# Patient Record
Sex: MALE | Race: WHITE | Employment: FULL TIME | URBAN - METROPOLITAN AREA
[De-identification: names, ages, dates, MRNs, and addresses within clinical notes are randomized per-mention and may not be internally consistent; named-entity substitution may affect disease eponyms.]

---

## 2017-02-20 ENCOUNTER — LAB ONLY (OUTPATIENT)
Dept: INTERNAL MEDICINE CLINIC | Age: 32
End: 2017-02-20

## 2017-02-20 DIAGNOSIS — G43.909 MIGRAINE WITHOUT STATUS MIGRAINOSUS, NOT INTRACTABLE, UNSPECIFIED MIGRAINE TYPE: ICD-10-CM

## 2017-02-20 DIAGNOSIS — Z13.1 DIABETES MELLITUS SCREENING: ICD-10-CM

## 2017-02-20 DIAGNOSIS — Z13.220 LIPID SCREENING: ICD-10-CM

## 2017-02-21 LAB
ALBUMIN SERPL-MCNC: 4.2 G/DL (ref 3.5–5.5)
ALBUMIN/GLOB SERPL: 1.7 {RATIO} (ref 1.1–2.5)
ALP SERPL-CCNC: 54 IU/L (ref 39–117)
ALT SERPL-CCNC: 11 IU/L (ref 0–44)
AST SERPL-CCNC: 15 IU/L (ref 0–40)
BASOPHILS # BLD AUTO: 0 X10E3/UL (ref 0–0.2)
BASOPHILS NFR BLD AUTO: 1 %
BILIRUB SERPL-MCNC: 0.5 MG/DL (ref 0–1.2)
BUN SERPL-MCNC: 8 MG/DL (ref 6–20)
BUN/CREAT SERPL: 10 (ref 8–19)
CALCIUM SERPL-MCNC: 9.6 MG/DL (ref 8.7–10.2)
CHLORIDE SERPL-SCNC: 101 MMOL/L (ref 96–106)
CHOLEST SERPL-MCNC: 192 MG/DL (ref 100–199)
CO2 SERPL-SCNC: 29 MMOL/L (ref 18–29)
CREAT SERPL-MCNC: 0.84 MG/DL (ref 0.76–1.27)
EOSINOPHIL # BLD AUTO: 0.2 X10E3/UL (ref 0–0.4)
EOSINOPHIL NFR BLD AUTO: 4 %
ERYTHROCYTE [DISTWIDTH] IN BLOOD BY AUTOMATED COUNT: 13.1 % (ref 12.3–15.4)
EST. AVERAGE GLUCOSE BLD GHB EST-MCNC: 111 MG/DL
GLOBULIN SER CALC-MCNC: 2.5 G/DL (ref 1.5–4.5)
GLUCOSE SERPL-MCNC: 98 MG/DL (ref 65–99)
HBA1C MFR BLD: 5.5 % (ref 4.8–5.6)
HCT VFR BLD AUTO: 44.3 % (ref 37.5–51)
HDLC SERPL-MCNC: 30 MG/DL
HGB BLD-MCNC: 15.3 G/DL (ref 12.6–17.7)
IMM GRANULOCYTES # BLD: 0 X10E3/UL (ref 0–0.1)
IMM GRANULOCYTES NFR BLD: 0 %
INTERPRETATION, 910389: NORMAL
LDLC SERPL CALC-MCNC: 121 MG/DL (ref 0–99)
LYMPHOCYTES # BLD AUTO: 1.3 X10E3/UL (ref 0.7–3.1)
LYMPHOCYTES NFR BLD AUTO: 31 %
MCH RBC QN AUTO: 31.7 PG (ref 26.6–33)
MCHC RBC AUTO-ENTMCNC: 34.5 G/DL (ref 31.5–35.7)
MCV RBC AUTO: 92 FL (ref 79–97)
MONOCYTES # BLD AUTO: 0.5 X10E3/UL (ref 0.1–0.9)
MONOCYTES NFR BLD AUTO: 11 %
NEUTROPHILS # BLD AUTO: 2.3 X10E3/UL (ref 1.4–7)
NEUTROPHILS NFR BLD AUTO: 53 %
PLATELET # BLD AUTO: 138 X10E3/UL (ref 150–379)
POTASSIUM SERPL-SCNC: 4.9 MMOL/L (ref 3.5–5.2)
PROT SERPL-MCNC: 6.7 G/DL (ref 6–8.5)
RBC # BLD AUTO: 4.82 X10E6/UL (ref 4.14–5.8)
SODIUM SERPL-SCNC: 142 MMOL/L (ref 134–144)
TRIGL SERPL-MCNC: 207 MG/DL (ref 0–149)
TSH SERPL DL<=0.005 MIU/L-ACNC: 1.26 UIU/ML (ref 0.45–4.5)
VLDLC SERPL CALC-MCNC: 41 MG/DL (ref 5–40)
WBC # BLD AUTO: 4.3 X10E3/UL (ref 3.4–10.8)

## 2017-02-24 ENCOUNTER — OFFICE VISIT (OUTPATIENT)
Dept: INTERNAL MEDICINE CLINIC | Age: 32
End: 2017-02-24

## 2017-02-24 VITALS
WEIGHT: 140 LBS | DIASTOLIC BLOOD PRESSURE: 61 MMHG | TEMPERATURE: 98.1 F | HEART RATE: 62 BPM | HEIGHT: 71 IN | RESPIRATION RATE: 16 BRPM | BODY MASS INDEX: 19.6 KG/M2 | SYSTOLIC BLOOD PRESSURE: 109 MMHG

## 2017-02-24 DIAGNOSIS — M79.642 BILATERAL HAND PAIN: Primary | ICD-10-CM

## 2017-02-24 DIAGNOSIS — S99.921A INJURY OF TOENAIL OF RIGHT FOOT, INITIAL ENCOUNTER: ICD-10-CM

## 2017-02-24 DIAGNOSIS — M79.641 BILATERAL HAND PAIN: Primary | ICD-10-CM

## 2017-02-24 DIAGNOSIS — G43.909 MIGRAINE WITHOUT STATUS MIGRAINOSUS, NOT INTRACTABLE, UNSPECIFIED MIGRAINE TYPE: ICD-10-CM

## 2017-02-24 NOTE — PATIENT INSTRUCTIONS
Learning About High Cholesterol  What is high cholesterol? Cholesterol is a type of fat in your blood. It is needed for many body functions, such as making new cells. Cholesterol is made by your body. It also comes from food you eat. If you have too much cholesterol, it starts to build up in your arteries. This is called hardening of the arteries, or atherosclerosis. High cholesterol raises your risk of a heart attack and stroke. There are different types of cholesterol. LDL is the \"bad\" cholesterol. High LDL can raise your risk for heart disease, heart attack, and stroke. HDL is the \"good\" cholesterol. High HDL is linked with a lower risk for heart disease, heart attack, and stroke. Your cholesterol levels help your doctor find out your risk for having a heart attack or stroke. How can you prevent high cholesterol? A heart-healthy lifestyle can help you prevent high cholesterol. This lifestyle helps lower your risk for a heart attack and stroke. · Eat heart-healthy foods. ¨ Eat fruits, vegetables, whole grains (like oatmeal), dried beans and peas, nuts and seeds, soy products (like tofu), and fat-free or low-fat dairy products. ¨ Replace butter, margarine, and hydrogenated or partially hydrogenated oils with olive and canola oils. (Canola oil margarine without trans fat is fine.)  ¨ Replace red meat with fish, poultry, and soy protein (like tofu). ¨ Limit processed and packaged foods like chips, crackers, and cookies. · Be active. Exercise can improve your cholesterol level. Get at least 30 minutes of exercise on most days of the week. Walking is a good choice. You also may want to do other activities, such as running, swimming, cycling, or playing tennis or team sports. · Stay at a healthy weight. Lose weight if you need to. · Don't smoke. If you need help quitting, talk to your doctor about stop-smoking programs and medicines. These can increase your chances of quitting for good.   How is high cholesterol treated? The goal of treatment is to reduce your chances of having a heart attack or stroke. The goal is not to lower your cholesterol numbers only. · You may make lifestyle changes, such as eating healthy foods, not smoking, losing weight, and being more active. · You may have to take medicine. Follow-up care is a key part of your treatment and safety. Be sure to make and go to all appointments, and call your doctor if you are having problems. It's also a good idea to know your test results and keep a list of the medicines you take. Where can you learn more? Go to http://vanessa-jaylen.info/. Enter L175 in the search box to learn more about \"Learning About High Cholesterol. \"  Current as of: January 27, 2016  Content Version: 11.1  © 0206-1491 [employer]. Care instructions adapted under license by [employer] (which disclaims liability or warranty for this information). If you have questions about a medical condition or this instruction, always ask your healthcare professional. [address] any warranty or liability for your use of this information.

## 2017-02-24 NOTE — PROGRESS NOTES
CC:  Chief Complaint   Patient presents with    Headache    Hand Pain     bilateral     HISTORY OF PRESENT ILLNESS  Jenny Alvarado is a 32 y.o. male    Presents for 4 month follow up evaluation. He has migraine headaches and tinea versicolor. Today he complains of bilateral hand pain after driving 16 hours to and from [place] last weekend. Also complains of thickened toenail at right foot does not grow and has started to hurt. Last weekend drove to and form [place] 16 hours each way. Saw Dr. Gregory Givens in 2/57 for complicated migraines. No changes made.     Soc Hx  Single. Lives alone. Engaged. No children. 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 Never smoker. Drinks 2-3 beers per week. Denies recreational drug use. Joined gym; goes every other day; doing yoga and running.  Drinks Coke 3 times a week.      ROS  Constitutional: negative for fevers, chills, night sweats  ENT:   negative for sore throat, nasal congestion; positive for mild allergy symptoms  Respiratory:  negative for cough, dyspnea, wheezing  CV:   negative for chest pain, palpitations, lower extremity edema  GI:   negative for heartburn, abd pain, nausea, vomiting, diarrhea, constipation  Genitourinary: negative for frequency, dysuria and hematuria  Integument:  negative for rash and pruritus  Musculoskel: negative for myalgias, arthralgias, back pain, muscle weakness, joint pain  Neurological:  negative for headaches, dizziness, vertigo, gait problems  Behavl/Psych: negative for feelings of anxiety, depression, mood change     Patient Active Problem List   Diagnosis Code    Migraine G43.909     Past Medical History:   Diagnosis Date    Headache     Headache(784.0)     Stroke ([facility] [address]      Allergies   Allergen Reactions    Cucumber Fruit Extract Anaphylaxis    Fioricet [Butalbital-Acetaminophen-Caff] Other (comments)     Current Outpatient Prescriptions   Medication Sig Dispense Refill    multivitamin (ONE A DAY) tablet Take 1 Tab by mouth daily. Gummies           PHYSICAL EXAM  Visit Vitals    /61 (BP 1 Location: Left arm, BP Patient Position: Sitting)    Pulse 62    Temp 98.1 °F (36.7 °C) (Oral)    Resp 16    Ht 5' 11\" (1.803 m)    Wt 140 lb (63.5 kg)    BMI 19.53 kg/m2       General: Well-developed and well-nourished, no distress. HEENT:  Head normocephalic/atraumatic, no scleral icterus  Lungs:  Clear to ausculation bilaterally. Good air movement. Heart:  Regular rate and rhythm, normal S1 and S2, no murmur, gallop, or rub  Extremities: No clubbing, cyanosis, or edema. Right 2nd toe with marked thickening and mild yellowish discoloration. No synovitis or swelling at hand joints. Normal  at hands. Neurological: Alert and oriented. Psychiatric: Normal mood and affect. Behavior is normal.     Results for orders placed or performed in visit on 54/44/88   METABOLIC PANEL, COMPREHENSIVE   Result Value Ref Range    Glucose 98 65 - 99 mg/dL    BUN 8 6 - 20 mg/dL    Creatinine 0.84 0.76 - 1.27 mg/dL    GFR est non- >59 mL/min/1.73    GFR est  >59 mL/min/1.73    BUN/Creatinine ratio 10 8 - 19    Sodium 142 134 - 144 mmol/L    Potassium 4.9 3.5 - 5.2 mmol/L    Chloride 101 96 - 106 mmol/L    CO2 29 18 - 29 mmol/L    Calcium 9.6 8.7 - 10.2 mg/dL    Protein, total 6.7 6.0 - 8.5 g/dL    Albumin 4.2 3.5 - 5.5 g/dL    GLOBULIN, TOTAL 2.5 1.5 - 4.5 g/dL    A-G Ratio 1.7 1.1 - 2.5    Bilirubin, total 0.5 0.0 - 1.2 mg/dL    Alk.  phosphatase 54 39 - 117 IU/L    AST (SGOT) 15 0 - 40 IU/L    ALT (SGPT) 11 0 - 44 IU/L   CBC WITH AUTOMATED DIFF   Result Value Ref Range    WBC 4.3 3.4 - 10.8 x10E3/uL    RBC 4.82 4.14 - 5.80 x10E6/uL    HGB 15.3 12.6 - 17.7 g/dL    HCT 44.3 37.5 - 51.0 %    MCV 92 79 - 97 fL    MCH 31.7 26.6 - 33.0 pg    MCHC 34.5 31.5 - 35.7 g/dL    RDW 13.1 12.3 - 15.4 %    PLATELET 146 (L) 562 - 379 x10E3/uL    NEUTROPHILS 53 %    Lymphocytes 31 %    MONOCYTES 11 %    EOSINOPHILS 4 %    BASOPHILS 1 % ABS. NEUTROPHILS 2.3 1.4 - 7.0 x10E3/uL    Abs Lymphocytes 1.3 0.7 - 3.1 x10E3/uL    ABS. MONOCYTES 0.5 0.1 - 0.9 x10E3/uL    ABS. EOSINOPHILS 0.2 0.0 - 0.4 x10E3/uL    ABS. BASOPHILS 0.0 0.0 - 0.2 x10E3/uL    IMMATURE GRANULOCYTES 0 %    ABS. IMM. GRANS. 0.0 0.0 - 0.1 x10E3/uL   HEMOGLOBIN A1C WITH EAG   Result Value Ref Range    Hemoglobin A1c 5.5 4.8 - 5.6 %    Estimated average glucose 111 mg/dL   TSH 3RD GENERATION   Result Value Ref Range    TSH 1.260 0.450 - 4.500 uIU/mL   LIPID PANEL   Result Value Ref Range    Cholesterol, total 192 100 - 199 mg/dL    Triglyceride 207 (H) 0 - 149 mg/dL    HDL Cholesterol 30 (L) >39 mg/dL    VLDL, calculated 41 (H) 5 - 40 mg/dL    LDL, calculated 121 (H) 0 - 99 mg/dL   CVD REPORT   Result Value Ref Range    INTERPRETATION Note          ASSESSMENT AND PLAN    ICD-10-CM ICD-9-CM    1. Bilateral hand pain M79.641 729.5     M79.642     2. Migraine without status migrainosus, not intractable, unspecified migraine type G43.909 346.90    3. Injury of toenail of right foot, initial encounter S99.921A 959.7 REFERRAL TO PODIATRY     Bilateral hand pain due to strain from prolonged driving. Referred to Podiatry. Current treatment plan is effective. No change in therapy. Lab results reviewed with patient    Follow-up Disposition:  Return in about 1 year (around 2/24/2018), or if symptoms worsen or fail to improve, for Annual physical examination. Provided patient and/or family with advanced directive information and answered pertinent questions. Encouraged patient to provide a copy of advanced directive to the office when available. I have discussed the diagnosis with the patient and the intended plan as seen in the above orders. Patient is in agreement. The patient has received an after-visit summary and questions were answered concerning future plans. I have discussed medication side effects and warnings with the patient as well.

## 2017-02-24 NOTE — MR AVS SNAPSHOT
Visit Information Date & Time Provider Department Dept. Phone Encounter #  
 2/24/2017  3:00 PM Sanford Cohen, [address] [facility] [phone] [id number] Follow-up Instructions Return in about 1 year (around 2/24/2018), or if symptoms worsen or fail to improve, for Annual physical examination. Upcoming Health Maintenance Date Due DTaP/Tdap/Td series (2 - Td) 10/19/2026 Allergies as of 2/24/2017  Review Complete On: 2/24/2017 By: Charlette Maloney LPN Severity Noted Reaction Type Reactions Cucumber Fruit Extract High 05/15/2014    Anaphylaxis Fioricet [Butalbital-acetaminophen-caff]  04/22/2014    Other (comments) Current Immunizations  Never Reviewed Name Date Influenza Vaccine 8/1/2016 Tdap 10/19/2016 Not reviewed this visit You Were Diagnosed With   
  
 Codes Comments Bilateral hand pain    -  Primary ICD-10-CM: M79.641, [id number] ICD-9-CM: 729.5 Migraine without status migrainosus, not intractable, unspecified migraine type     ICD-10-CM: G43.909 ICD-9-CM: 346.90 Injury of toenail of right foot, initial encounter     ICD-10-CM: P76.656X ICD-9-CM: 332. 7 Vitals BP  
  
  
  
  
  
 109/61 (BP 1 Location: Left arm, BP Patient Position: Sitting) BMI and BSA Data Body Mass Index Body Surface Area  
 19.53 kg/m 2 1.78 m 2 Preferred Pharmacy Pharmacy Name Phone [facility] [phone] Your Updated Medication List  
  
   
This list is accurate as of: 2/24/17  3:18 PM.  Always use your most recent med list.  
  
  
  
  
 multivitamin tablet Commonly known as:  ONE A DAY Take 1 Tab by mouth daily. Gummies We Performed the Following REFERRAL TO PODIATRY [REF90 Custom] Comments:  
 Please evaluate and manage abnormal toenail growth after injury and possible onychomycosis at right 2nd toe. Follow-up Instructions Return in about 1 year (around 2/24/2018), or if symptoms worsen or fail to improve, for Annual physical examination. Referral Information Referral ID Referred By Referred To  
  
 [id number] [facility], [address] Gerard NICOLAS Asif, [address] Visits Status Start Date End Date 1 New Request 2/24/17 2/24/18 If your referral has a status of pending review or denied, additional information will be sent to support the outcome of this decision. Patient Instructions Learning About High Cholesterol What is high cholesterol? Cholesterol is a type of fat in your blood. It is needed for many body functions, such as making new cells. Cholesterol is made by your body. It also comes from food you eat. If you have too much cholesterol, it starts to build up in your arteries. This is called hardening of the arteries, or atherosclerosis. High cholesterol raises your risk of a heart attack and stroke. There are different types of cholesterol. LDL is the \"bad\" cholesterol. High LDL can raise your risk for heart disease, heart attack, and stroke. HDL is the \"good\" cholesterol. High HDL is linked with a lower risk for heart disease, heart attack, and stroke. Your cholesterol levels help your doctor find out your risk for having a heart attack or stroke. How can you prevent high cholesterol? A heart-healthy lifestyle can help you prevent high cholesterol. This lifestyle helps lower your risk for a heart attack and stroke. · Eat heart-healthy foods. ¨ Eat fruits, vegetables, whole grains (like oatmeal), dried beans and peas, nuts and seeds, soy products (like tofu), and fat-free or low-fat dairy products. ¨ Replace butter, margarine, and hydrogenated or partially hydrogenated oils with olive and canola oils. (Canola oil margarine without trans fat is fine.) ¨ Replace red meat with fish, poultry, and soy protein (like tofu). ¨ Limit processed and packaged foods like chips, crackers, and cookies. · Be active. Exercise can improve your cholesterol level. Get at least 30 minutes of exercise on most days of the week. Walking is a good choice. You also may want to do other activities, such as running, swimming, cycling, or playing tennis or team sports. · Stay at a healthy weight. Lose weight if you need to. · Don't smoke. If you need help quitting, talk to your doctor about stop-smoking programs and medicines. These can increase your chances of quitting for good. How is high cholesterol treated? The goal of treatment is to reduce your chances of having a heart attack or stroke. The goal is not to lower your cholesterol numbers only. · You may make lifestyle changes, such as eating healthy foods, not smoking, losing weight, and being more active. · You may have to take medicine. Follow-up care is a key part of your treatment and safety. Be sure to make and go to all appointments, and call your doctor if you are having problems. It's also a good idea to know your test results and keep a list of the medicines you take. Where can you learn more? Go to http://vanessa-jaylen.info/. Enter Y330 in the search box to learn more about \"Learning About High Cholesterol. \" Current as of: January 27, 2016 Content Version: 11.1 © 6689-4051 [employer]. Care instructions adapted under license by [employer] (which disclaims liability or warranty for this information). If you have questions about a medical condition or this instruction, always ask your healthcare professional. [address] any warranty or liability for your use of this information. Introducing [facility]! Dear Larry Fearing: Thank you for requesting a [employer] account.   Our records indicate that you already have an active ShotSpotter account. You can access your account anytime at [email or web address] Did you know that you can access your hospital and ER discharge instructions at any time in ShotSpotter? You can also review all of your test results from your hospital stay or ER visit. Additional Information If you have questions, please visit the Frequently Asked Questions section of the ShotSpotter website at [email or web address] Remember, ShotSpotter is NOT to be used for urgent needs. For medical emergencies, dial 911. Now available from your iPhone and Android! Please provide this summary of care documentation to your next provider. Your primary care clinician is listed as Migdalia Carlin. If you have any questions after today's visit, please call [phone].

## 2017-02-24 NOTE — PROGRESS NOTES
Will discuss with pt at today's appt. Normal CMP, A1c, TSH. Plt ct low at 138K, rest of CBC nl. Tot chol nl but TG high at 207 with HDL low at  30.

## 2017-02-24 NOTE — PROGRESS NOTES
Reviewed record  In preparation for visit and have obtained necessary documentation. 1. Have you been to the ER, urgent care clinic since your last visit? Hospitalized since your last visit?no  2. Have you seen or consulted any other health care providers outside of the [address] [place] [address] since your last visit? Include any pap smears or colon screening. Saw neuro Dr Josie Garcia    Patient does not currently have advance directives. Patient given information on advance directives and brochure and printed blank advance directive form made available to patient. Patient is also asked to make copy of directives available to this office for our/[facility] records once advanced directives are completed.

## 2017-09-11 ENCOUNTER — OFFICE VISIT (OUTPATIENT)
Dept: FAMILY MEDICINE CLINIC | Age: 32
End: 2017-09-11

## 2017-09-11 VITALS
RESPIRATION RATE: 16 BRPM | WEIGHT: 144.8 LBS | TEMPERATURE: 97.7 F | SYSTOLIC BLOOD PRESSURE: 121 MMHG | HEIGHT: 71 IN | HEART RATE: 75 BPM | DIASTOLIC BLOOD PRESSURE: 85 MMHG | BODY MASS INDEX: 20.27 KG/M2 | OXYGEN SATURATION: 98 %

## 2017-09-11 DIAGNOSIS — J02.9 SORE THROAT: ICD-10-CM

## 2017-09-11 DIAGNOSIS — R09.82 POST-NASAL DRIP: Primary | ICD-10-CM

## 2017-09-11 LAB
S PYO AG THROAT QL: NEGATIVE
VALID INTERNAL CONTROL?: YES

## 2017-09-11 NOTE — PROGRESS NOTES
Karlie Orellana is a 28 y.o. male   Chief Complaint   Patient presents with    Sore Throat     Started last pm     Pt states he moved a lot of moldy carpeting yesterday then noticed his throat was feeling swollen and sore along with a runny nose and a cough. Non productive cough. Took mucinex D last night without any relief. he is a 28y.o. year old male who presents for evalution. Reviewed PmHx, RxHx, FmHx, SocHx, AllgHx and updated and dated in the chart. Review of Systems - negative except as listed above in the HPI    Objective:     Vitals:    09/11/17 1231   BP: 121/85   Pulse: 75   Resp: 16   Temp: 97.7 °F (36.5 °C)   TempSrc: Oral   SpO2: 98%   Weight: 144 lb 12.8 oz (65.7 kg)   Height: 5' 11\" (1.803 m)       Current Outpatient Prescriptions   Medication Sig    multivitamin (ONE A DAY) tablet Take 1 Tab by mouth daily. Gummies     No current facility-administered medications for this visit. Physical Examination: General appearance - alert, well appearing, and in no distress  Eyes - pupils equal and reactive, extraocular eye movements intact  Ears - bilateral TM's and external ear canals normal  Mouth - cobblestoning of post pharynx  Neck - supple, no significant adenopathy  Chest - clear to auscultation, no wheezes, rales or rhonchi, symmetric air entry  Heart - normal rate, regular rhythm, normal S1, S2, no murmurs, rubs, clicks or gallops      Assessment/ Plan:   Diagnoses and all orders for this visit:    1. Post-nasal drip    2. Sore throat  -     AMB POC RAPID STREP A     no strep, zyrtec daily, benadryl qhs prn  Follow-up Disposition:  Return if symptoms worsen or fail to improve. I have discussed the diagnosis with the patient and the intended plan as seen in the above orders. The patient has received an after-visit summary and questions were answered concerning future plans. Pt conveyed understanding of plan.     Medication Side Effects and Warnings were discussed with patient      [address] [facility], DO

## 2017-09-11 NOTE — PATIENT INSTRUCTIONS
Sore Throat: Care Instructions  Your Care Instructions    Infection by bacteria or a virus causes most sore throats. Cigarette smoke, dry air, air pollution, allergies, and yelling can also cause a sore throat. Sore throats can be painful and annoying. Fortunately, most sore throats go away on their own. If you have a bacterial infection, your doctor may prescribe antibiotics. Follow-up care is a key part of your treatment and safety. Be sure to make and go to all appointments, and call your doctor if you are having problems. It's also a good idea to know your test results and keep a list of the medicines you take. How can you care for yourself at home? · If your doctor prescribed antibiotics, take them as directed. Do not stop taking them just because you feel better. You need to take the full course of antibiotics. · Gargle with warm salt water once an hour to help reduce swelling and relieve discomfort. Use 1 teaspoon of salt mixed in 1 cup of warm water. · Take an over-the-counter pain medicine, such as acetaminophen (Tylenol), ibuprofen (Advil, Motrin), or naproxen (Aleve). Read and follow all instructions on the label. · Be careful when taking over-the-counter cold or flu medicines and Tylenol at the same time. Many of these medicines have acetaminophen, which is Tylenol. Read the labels to make sure that you are not taking more than the recommended dose. Too much acetaminophen (Tylenol) can be harmful. · Drink plenty of fluids. Fluids may help soothe an irritated throat. Hot fluids, such as tea or soup, may help decrease throat pain. · Use over-the-counter throat lozenges to soothe pain. Regular cough drops or hard candy may also help. These should not be given to young children because of the risk of choking. · Do not smoke or allow others to smoke around you. If you need help quitting, talk to your doctor about stop-smoking programs and medicines.  These can increase your chances of quitting for good.  · Use a vaporizer or humidifier to add moisture to your bedroom. Follow the directions for cleaning the machine. When should you call for help? Call your doctor now or seek immediate medical care if:  · You have new or worse trouble swallowing. · Your sore throat gets much worse on one side. Watch closely for changes in your health, and be sure to contact your doctor if you do not get better as expected. Where can you learn more? Go to http://vanessa-jaylen.info/. Enter [phone] in the search box to learn more about \"Sore Throat: Care Instructions. \"  Current as of: July 29, 2016  Content Version: 11.3  © 6908-3850 [employer]. Care instructions adapted under license by [employer] [facility] (which disclaims liability or warranty for this information). If you have questions about a medical condition or this instruction, always ask your healthcare professional. [address] any warranty or liability for your use of this information.

## 2018-05-10 ENCOUNTER — OFFICE VISIT (OUTPATIENT)
Dept: INTERNAL MEDICINE CLINIC | Facility: CLINIC | Age: 33
End: 2018-05-10

## 2018-05-10 VITALS
DIASTOLIC BLOOD PRESSURE: 59 MMHG | TEMPERATURE: 97.6 F | BODY MASS INDEX: 19.32 KG/M2 | WEIGHT: 138 LBS | RESPIRATION RATE: 16 BRPM | SYSTOLIC BLOOD PRESSURE: 99 MMHG | OXYGEN SATURATION: 98 % | HEART RATE: 59 BPM | HEIGHT: 71 IN

## 2018-05-10 DIAGNOSIS — N41.0 ACUTE PROSTATITIS: ICD-10-CM

## 2018-05-10 DIAGNOSIS — R31.9 HEMATURIA, UNSPECIFIED TYPE: Primary | ICD-10-CM

## 2018-05-10 LAB
BILIRUB UR QL STRIP: NEGATIVE
GLUCOSE UR-MCNC: NEGATIVE MG/DL
KETONES P FAST UR STRIP-MCNC: NEGATIVE MG/DL
PH UR STRIP: 7 [PH] (ref 4.6–8)
PROT UR QL STRIP: NEGATIVE
SP GR UR STRIP: 1.02 (ref 1–1.03)
UA UROBILINOGEN AMB POC: NORMAL (ref 0.2–1)
URINALYSIS CLARITY POC: CLEAR
URINALYSIS COLOR POC: YELLOW
URINE BLOOD POC: NORMAL
URINE LEUKOCYTES POC: NEGATIVE
URINE NITRITES POC: NEGATIVE

## 2018-05-10 RX ORDER — CIPROFLOXACIN 500 MG/1
500 TABLET ORAL 2 TIMES DAILY
Qty: 42 TAB | Refills: 1 | Status: SHIPPED | OUTPATIENT
Start: 2018-05-10 | End: 2018-06-11 | Stop reason: ALTCHOICE

## 2018-05-10 NOTE — PATIENT INSTRUCTIONS
Prostatitis: Care Instructions  Your Care Instructions    The prostate gland is a small, walnut-shaped organ. It lies just below a man's bladder. It surrounds the urethra, the tube that carries urine through the penis and out of the body. Prostatitis is a painful condition caused by inflammation or infection of the prostate gland. Sometimes the condition is caused by bacteria, but often the cause is not known. Prostatitis caused by bacteria usually is treated with self-care and antibiotics. Follow-up care is a key part of your treatment and safety. Be sure to make and go to all appointments, and call your doctor if you are having problems. It's also a good idea to know your test results and keep a list of the medicines you take. How can you care for yourself at home? · If your doctor prescribed antibiotics, take them as directed. Do not stop taking them just because you feel better. You need to take the full course of antibiotics. · Take an over-the-counter pain medicine, such as acetaminophen (Tylenol), ibuprofen (Advil, Motrin), or naproxen (Aleve). Be safe with medicines. Read and follow all instructions on the label. · Take warm baths to help soothe pain. · Straining to pass stools can hurt when your prostate is inflamed. Avoid constipation. ¨ Include fruits, vegetables, beans, and whole grains in your diet each day. These foods are high in fiber. ¨ Drink plenty of fluids, enough so that your urine is light yellow or clear like water. If you have kidney, heart, or liver disease and have to limit fluids, talk with your doctor before you increase the amount of fluids you drink. ¨ Get some exercise every day. Build up slowly to 30 to 60 minutes a day on 5 or more days of the week. ¨ Take a fiber supplement, such as Citrucel or Metamucil, every day if needed. Read and follow all instructions on the label. ¨ Schedule time each day for a bowel movement. Having a daily routine may help.  Take your time and do not strain when having a bowel movement. · Avoid alcohol, caffeine, and spicy foods, especially if they make your symptoms worse. When should you call for help? Call your doctor now or seek immediate medical care if:  ? · You have symptoms of a urinary tract infection. These may include:  ¨ Pain or burning when you urinate. ¨ A frequent need to urinate without being able to pass much urine. ¨ Pain in the flank, which is just below the rib cage and above the waist on either side of the back. ¨ Blood in your urine. ¨ A fever. ? Watch closely for changes in your health, and be sure to contact your doctor if:  ? · You cannot empty your bladder completely. ? · You do not get better as expected. Where can you learn more? Go to http://vanessa-jaylen.info/. Enter H800 in the search box to learn more about \"Prostatitis: Care Instructions. \"  Current as of: March 14, 2017  Content Version: 11.4  © 0420-5474 AKT. Care instructions adapted under license by Sypher[employer] (which disclaims liability or warranty for this information). If you have questions about a medical condition or this instruction, always ask your healthcare professional. [place] [address] any warranty or liability for your use of this information. Blood in the Urine: Care Instructions  Your Care Instructions    Blood in the urine, or hematuria, may make the urine look red, brown, or pink. There may be blood every time you urinate or just from time to time. You cannot always see blood in the urine, but it will show up in a urine test.  Blood in the urine may be serious. It should always be checked by a doctor. Your doctor may recommend more tests, including an X-ray, a CT scan, or a cystoscopy (which lets a doctor look inside the urethra and bladder). Blood in the urine can be a sign of another problem. Common causes are bladder infections and kidney stones.  An injury to your groin or your genital area can also cause bleeding in the urinary tract. Very hard exercise-such as running a marathon-can cause blood in the urine. Blood in the urine can also be a sign of kidney disease or cancer in the bladder or kidney. Many cases of blood in the urine are caused by a harmless condition that runs in families. This is called benign familial hematuria. It does not need any treatment. Sometimes your urine may look red or brown even though it does not contain blood. For example, not getting enough fluids (dehydration), taking certain medicines, or having a liver problem can change the color of your urine. Eating foods such as beets, rhubarb, or blackberries or foods with red food coloring can make your urine look red or pink. Follow-up care is a key part of your treatment and safety. Be sure to make and go to all appointments, and call your doctor if you are having problems. It's also a good idea to know your test results and keep a list of the medicines you take. When should you call for help? Call your doctor now or seek immediate medical care if:  · You have symptoms of a urinary infection. For example:  ¨ You have pus in your urine. ¨ You have pain in your back just below your rib cage. This is called flank pain. ¨ You have a fever, chills, or body aches. ¨ It hurts to urinate. ¨ You have groin or belly pain. · You have more blood in your urine. Watch closely for changes in your health, and be sure to contact your doctor if:  · You have new urination problems. · You do not get better as expected. Where can you learn more? Go to http://vanessa-jaylen.info/. Enter D620 in the search box to learn more about \"Blood in the Urine: Care Instructions. \"  Current as of: May 12, 2017  Content Version: 11.4  © 4251-3696 [employer]. Care instructions adapted under license by [employer]otofeedback (which disclaims liability or warranty for this information). If you have questions about a medical condition or this instruction, always ask your healthcare professional. [place] [address] any warranty or liability for your use of this information.

## 2018-05-10 NOTE — MR AVS SNAPSHOT
[address] [address] [phone] Patient: Sean Chacon MRN: [id number] :1985 Visit Information Date & Time Provider Department Dept. Phone Encounter #  
 5/10/2018  9:10 AM MD [facility] [phone] [id number] Follow-up Instructions Return in about 1 month (around 6/10/2018), or if symptoms worsen or fail to improve, for Annual physical exam; fasting labs 1 week prior to appt. Upcoming Health Maintenance Date Due Influenza Age 5 to Adult 2018 DTaP/Tdap/Td series (2 - Td) 10/19/2026 Allergies as of 5/10/2018  Review Complete On: 5/10/2018 By: Germania Charles MD  
  
 Severity Noted Reaction Type Reactions Cucumber Fruit Extract High 05/15/2014    Anaphylaxis Fioricet [Butalbital-acetaminophen-caff]  2014    Other (comments) Current Immunizations  Never Reviewed Name Date Influenza Vaccine 2016 Tdap 10/19/2016 Not reviewed this visit You Were Diagnosed With   
  
 Codes Comments Hematuria, unspecified type    -  Primary ICD-10-CM: R31.9 ICD-9-CM: 599.70 Acute prostatitis     ICD-10-CM: N41.0 ICD-9-CM: 601.0 Vitals BP Pulse Temp Resp Height(growth percentile) Weight(growth percentile) 99/59 (BP 1 Location: Right arm, BP Patient Position: Sitting) (!) 59 97.6 °F (36.4 °C) (Oral) 16 5' 11\" (1.803 m) 138 lb (62.6 kg) SpO2 BMI Smoking Status 98% 19.25 kg/m2 Never Smoker BMI and BSA Data Body Mass Index Body Surface Area  
 19.25 kg/m 2 1.77 m 2 Preferred Pharmacy Pharmacy Name Phone [id number] [phone] Your Updated Medication List  
  
   
This list is accurate as of 5/10/18  9:29 AM.  Always use your most recent med list.  
  
  
  
  
 ciprofloxacin HCl 500 mg tablet Commonly known as:  CIPRO Take 1 Tab by mouth two (2) times a day. Take total of 6 weeks. Prescriptions Sent to Pharmacy Refills  
 ciprofloxacin HCl (CIPRO) 500 mg tablet 1 Sig: Take 1 Tab by mouth two (2) times a day. Take total of 6 weeks. Class: Normal  
 Pharmacy: [id number], [address] Ph #: [phone] Route: Oral  
  
We Performed the Following AMB POC URINALYSIS DIP STICK AUTO W/O MICRO [39274 CPT(R)] Follow-up Instructions Return in about 1 month (around 6/10/2018), or if symptoms worsen or fail to improve, for Annual physical exam; fasting labs 1 week prior to appt. To-Do List   
 05/10/2018 Imaging:  CT ABD PELV WO CONT Patient Instructions Prostatitis: Care Instructions Your Care Instructions The prostate gland is a small, walnut-shaped organ. It lies just below a man's bladder. It surrounds the urethra, the tube that carries urine through the penis and out of the body. Prostatitis is a painful condition caused by inflammation or infection of the prostate gland. Sometimes the condition is caused by bacteria, but often the cause is not known. Prostatitis caused by bacteria usually is treated with self-care and antibiotics. Follow-up care is a key part of your treatment and safety. Be sure to make and go to all appointments, and call your doctor if you are having problems. It's also a good idea to know your test results and keep a list of the medicines you take. How can you care for yourself at home? · If your doctor prescribed antibiotics, take them as directed. Do not stop taking them just because you feel better. You need to take the full course of antibiotics. · Take an over-the-counter pain medicine, such as acetaminophen (Tylenol), ibuprofen (Advil, Motrin), or naproxen (Aleve). Be safe with medicines. Read and follow all instructions on the label. · Take warm baths to help soothe pain. · Straining to pass stools can hurt when your prostate is inflamed. Avoid constipation. ¨ Include fruits, vegetables, beans, and whole grains in your diet each day. These foods are high in fiber. ¨ Drink plenty of fluids, enough so that your urine is light yellow or clear like water. If you have kidney, heart, or liver disease and have to limit fluids, talk with your doctor before you increase the amount of fluids you drink. ¨ Get some exercise every day. Build up slowly to 30 to 60 minutes a day on 5 or more days of the week. ¨ Take a fiber supplement, such as Citrucel or Metamucil, every day if needed. Read and follow all instructions on the label. ¨ Schedule time each day for a bowel movement. Having a daily routine may help. Take your time and do not strain when having a bowel movement. · Avoid alcohol, caffeine, and spicy foods, especially if they make your symptoms worse. When should you call for help? Call your doctor now or seek immediate medical care if: 
? · You have symptoms of a urinary tract infection. These may include: 
¨ Pain or burning when you urinate. ¨ A frequent need to urinate without being able to pass much urine. ¨ Pain in the flank, which is just below the rib cage and above the waist on either side of the back. ¨ Blood in your urine. ¨ A fever. ? Watch closely for changes in your health, and be sure to contact your doctor if: 
? · You cannot empty your bladder completely. ? · You do not get better as expected. Where can you learn more? Go to http://vanessa-jaylen.info/. Enter X930 in the search box to learn more about \"Prostatitis: Care Instructions. \" Current as of: March 14, 2017 Content Version: 11.4 © 6381-9646 [employer]iaopai.  Care instructions adapted under license by [employer] (which disclaims liability or warranty for this information). If you have questions about a medical condition or this instruction, always ask your healthcare professional. [address] any warranty or liability for your use of this information. Blood in the Urine: Care Instructions Your Care Instructions Blood in the urine, or hematuria, may make the urine look red, brown, or pink. There may be blood every time you urinate or just from time to time. You cannot always see blood in the urine, but it will show up in a urine test. 
Blood in the urine may be serious. It should always be checked by a doctor. Your doctor may recommend more tests, including an X-ray, a CT scan, or a cystoscopy (which lets a doctor look inside the urethra and bladder). Blood in the urine can be a sign of another problem. Common causes are bladder infections and kidney stones. An injury to your groin or your genital area can also cause bleeding in the urinary tract. Very hard exercise-such as running a marathon-can cause blood in the urine. Blood in the urine can also be a sign of kidney disease or cancer in the bladder or kidney. Many cases of blood in the urine are caused by a harmless condition that runs in families. This is called benign familial hematuria. It does not need any treatment. Sometimes your urine may look red or brown even though it does not contain blood. For example, not getting enough fluids (dehydration), taking certain medicines, or having a liver problem can change the color of your urine. Eating foods such as beets, rhubarb, or blackberries or foods with red food coloring can make your urine look red or pink. Follow-up care is a key part of your treatment and safety. Be sure to make and go to all appointments, and call your doctor if you are having problems. It's also a good idea to know your test results and keep a list of the medicines you take. When should you call for help? Call your doctor now or seek immediate medical care if: 
· You have symptoms of a urinary infection. For example: ¨ You have pus in your urine. ¨ You have pain in your back just below your rib cage. This is called flank pain. ¨ You have a fever, chills, or body aches. ¨ It hurts to urinate. ¨ You have groin or belly pain. · You have more blood in your urine. Watch closely for changes in your health, and be sure to contact your doctor if: 
· You have new urination problems. · You do not get better as expected. Where can you learn more? Go to http://vanessa-jaylen.info/. Enter P564 in the search box to learn more about \"Blood in the Urine: Care Instructions. \" Current as of: May 12, 2017 Content Version: 11.4 © 2424-4738 [employer]. Care instructions adapted under license by Arisd[employer] Systems (which disclaims liability or warranty for this information). If you have questions about a medical condition or this instruction, always ask your healthcare professional. [address] any warranty or liability for your use of this information. Introducing [facility]! Dear Anthony Shah: Thank you for requesting a Kanbanize account. Our records indicate that you already have an active Kanbanize account. You can access your account anytime at [email or web address] Did you know that you can access your hospital and ER discharge instructions at any time in Kanbanize? You can also review all of your test results from your hospital stay or ER visit. Additional Information If you have questions, please visit the Frequently Asked Questions section of the Kanbanize website at [email or web address] Remember, Kanbanize is NOT to be used for urgent needs. For medical emergencies, dial 911. Now available from your iPhone and Android! Please provide this summary of care documentation to your next provider. Your primary care clinician is listed as Rhunette Nissen. If you have any questions after today's visit, please call [phone].

## 2018-05-10 NOTE — PROGRESS NOTES
CC:   Chief Complaint   Patient presents with    Blood in Urine       HISTORY OF PRESENT ILLNESS  Kasey Mcdaniel is a 28 y.o. male. Patient complains of blood in the urine. He has migraine headaches and tinea versicolor. Noticed bright red blood this morning when he urinated. Over past 2 months, has been noticing a white chalky substance coming out of his urine, that even stuck to the back of the urinal once; also having rectal pain. Denies fevers, chills, dysuria, urgency, or frequency. Had history of hematuria once before about 2 years ago when he became very dehydrated. Had prostatitis while in [place].  Current rectal pain but sharper this time. Denies history of kidney stones. Has been drinking a lot of water. Soc H  Single. Lives alone. Engaged. No children. 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 Never smoker. Drinks 2-3 beers per week. Denies recreational drug use. Joined gym; goes every other day; doing yoga and running. Drinks Coke 3 times a week. Patient Active Problem List   Diagnosis Code    Migraine G43.909     Past Medical History:   Diagnosis Date    Headache     Headache(784.0)     Stroke ([address]      Allergies   Allergen Reactions    Cucumber Fruit Extract Anaphylaxis    Fioricet [Butalbital-Acetaminophen-Caff] Other (comments)             PHYSICAL EXAM  Visit Vitals    BP 99/59 (BP 1 Location: Right arm, BP Patient Position: Sitting)    Pulse (!) 59    Temp 97.6 °F (36.4 °C) (Oral)    Resp 16    Ht 5' 11\" (1.803 m)    Wt 138 lb (62.6 kg)    SpO2 98%    BMI 19.25 kg/m2       General: Well-developed and well-nourished, no distress. HEENT:  Head normocephalic/atraumatic, no scleral icterus  Lungs:  Clear to ausculation bilaterally. Good air movement. Heart:  Regular rate and rhythm, normal S1 and S2, no murmur, gallop, or rub  Extremities: No clubbing, cyanosis, or edema. Rectal: Tender, mildly enlarged prostate.   Neurological: Alert and oriented. Psychiatric: Normal mood and affect. Behavior is normal.     Results for orders placed or performed in visit on 05/10/18   AMB POC URINALYSIS DIP STICK AUTO W/O MICRO   Result Value Ref Range    Color (UA POC) Yellow     Clarity (UA POC) Clear     Glucose (UA POC) Negative Negative    Bilirubin (UA POC) Negative Negative    Ketones (UA POC) Negative Negative    Specific gravity (UA POC) 1.020 1.001 - 1.035    Blood (UA POC) 3+ Negative    pH (UA POC) 7.0 4.6 - 8.0    Protein (UA POC) Negative Negative    Urobilinogen (UA POC) 0.2 mg/dL 0.2 - 1    Nitrites (UA POC) Negative Negative    Leukocyte esterase (UA POC) Negative Negative     Lab Results   Component Value Date/Time    Sodium 142 02/20/2017 08:30 AM    Potassium 4.9 02/20/2017 08:30 AM    Chloride 101 02/20/2017 08:30 AM    CO2 29 02/20/2017 08:30 AM    Glucose 98 02/20/2017 08:30 AM    BUN 8 02/20/2017 08:30 AM    Creatinine 0.84 02/20/2017 08:30 AM    BUN/Creatinine ratio 10 02/20/2017 08:30 AM    GFR est  02/20/2017 08:30 AM    GFR est non- 02/20/2017 08:30 AM    Calcium 9.6 02/20/2017 08:30 AM    Bilirubin, total 0.5 02/20/2017 08:30 AM    AST (SGOT) 15 02/20/2017 08:30 AM    Alk. phosphatase 54 02/20/2017 08:30 AM    Protein, total 6.7 02/20/2017 08:30 AM    Albumin 4.2 02/20/2017 08:30 AM    A-G Ratio 1.7 02/20/2017 08:30 AM    ALT (SGPT) 11 02/20/2017 08:30 AM             ASSESSMENT AND PLAN    ICD-10-CM ICD-9-CM    1. Hematuria, unspecified type R31.9 599.70 AMB POC URINALYSIS DIP STICK AUTO W/O MICRO      CT ABD PELV WO CONT   2. Acute prostatitis N41.0 601.0 ciprofloxacin HCl (CIPRO) 500 mg tablet       Diagnoses and all orders for this visit:    1. Hematuria, unspecified type  Due to acute prostatitis and/or kidney stones. Will get CT to rule out kidney stones. -     AMB POC URINALYSIS DIP STICK AUTO W/O MICRO  -     CT ABD PELV WO CONT; Future    2.  Acute prostatitis  -     Start ciprofloxacin HCl (CIPRO) 500 mg tablet; Take 1 Tab by mouth two (2) times a day. Take total of 6 weeks. (#21, 1 RF)      Follow-up Disposition:  Return in about 1 month (around 6/10/2018), or if symptoms worsen or fail to improve, for Annual physical exam; fasting labs 1 week prior to appt. Provided patient and/or family with advanced directive information and answered pertinent questions. Encouraged patient to provide a copy of advanced directive to the office when available. I have discussed the diagnosis with the patient and the intended plan as seen in the above orders. Patient is in agreement. The patient has received an after-visit summary and questions were answered concerning future plans. I have discussed medication side effects and warnings with the patient as well.

## 2018-05-10 NOTE — PROGRESS NOTES
Ryder Calzada  Identified pt with two pt identifiers(name and ). Chief Complaint   Patient presents with    Blood in Urine       1. Have you been to the ER, urgent care clinic since your last visit? Hospitalized since your last visit? NO    2. Have you seen or consulted any other health care providers outside of the [facility] since your last visit? Include any pap smears or colon screening. NO    Today's provider has been notified of reason for visit, vitals and flowsheets obtained on patients. Patient received paperwork for advance directive during previous visit but has not completed at this time     Reviewed record In preparation for visit, huddled with provider and have obtained necessary documentation      There are no preventive care reminders to display for this patient.     Wt Readings from Last 3 Encounters:   05/10/18 138 lb (62.6 kg)   17 144 lb 12.8 oz (65.7 kg)   17 140 lb (63.5 kg)     Temp Readings from Last 3 Encounters:   05/10/18 97.6 °F (36.4 °C) (Oral)   17 97.7 °F (36.5 °C) (Oral)   17 98.1 °F (36.7 °C) (Oral)     BP Readings from Last 3 Encounters:   05/10/18 99/59   17 121/85   17 109/61     Pulse Readings from Last 3 Encounters:   05/10/18 (!) 59   17 75   17 62     Vitals:    05/10/18 0902   BP: 99/59   Pulse: (!) 59   Resp: 16   Temp: 97.6 °F (36.4 °C)   TempSrc: Oral   SpO2: 98%   Weight: 138 lb (62.6 kg)   Height: 5' 11\" (1.803 m)   PainSc:   1         Learning Assessment:  :     Learning Assessment 2016   PRIMARY LEARNER Patient   HIGHEST LEVEL OF EDUCATION - PRIMARY LEARNER  > 4 YEARS OF COLLEGE   BARRIERS PRIMARY LEARNER NONE   CO-LEARNER CAREGIVER No   PRIMARY LANGUAGE ENGLISH   LEARNER PREFERENCE PRIMARY READING   ANSWERED BY patient   RELATIONSHIP SELF       Depression Screening:  :     PHQ over the last two weeks 5/10/2018   Little interest or pleasure in doing things Not at all   Feeling down, depressed or hopeless Not at all   Total Score PHQ 2 0       Fall Risk Assessment:  :     No flowsheet data found. Abuse Screening:  :     No flowsheet data found. ADL Screening:  :     ADL Assessment 5/10/2018   Feeding yourself No Help Needed   Getting from bed to chair No Help Needed   Getting dressed No Help Needed   Bathing or showering No Help Needed   Walk across the room (includes cane/walker) No Help Needed   Using the telphone No Help Needed   Taking your medications No Help Needed   Preparing meals No Help Needed   Managing money (expenses/bills) No Help Needed   Moderately strenuous housework (laundry) No Help Needed   Shopping for personal items (toiletries/medicines) No Help Needed   Shopping for groceries No Help Needed   Driving No Help Needed   Climbing a flight of stairs No Help Needed   Getting to places beyond walking distances No Help Needed             Per Dr. Dino Mckeon verbal order read back orders placed for urine dip. Medication reconciliation up to date and corrected with patient at this time.

## 2018-05-30 ENCOUNTER — TELEPHONE (OUTPATIENT)
Dept: INTERNAL MEDICINE CLINIC | Facility: CLINIC | Age: 33
End: 2018-05-30

## 2018-05-31 ENCOUNTER — HOSPITAL ENCOUNTER (OUTPATIENT)
Dept: CT IMAGING | Age: 33
Discharge: HOME | End: 2018-05-31
Attending: INTERNAL MEDICINE
Payer: COMMERCIAL

## 2018-05-31 DIAGNOSIS — Z00.00 ROUTINE GENERAL MEDICAL EXAMINATION AT A HEALTH CARE FACILITY: Primary | ICD-10-CM

## 2018-05-31 DIAGNOSIS — R31.9 HEMATURIA, UNSPECIFIED TYPE: ICD-10-CM

## 2018-05-31 PROCEDURE — 74176 CT ABD & PELVIS W/O CONTRAST: CPT

## 2018-06-01 ENCOUNTER — APPOINTMENT (OUTPATIENT)
Dept: INTERNAL MEDICINE CLINIC | Facility: CLINIC | Age: 33
End: 2018-06-01

## 2018-06-01 ENCOUNTER — TELEPHONE (OUTPATIENT)
Dept: INTERNAL MEDICINE CLINIC | Facility: CLINIC | Age: 33
End: 2018-06-01

## 2018-06-01 DIAGNOSIS — Z00.00 ROUTINE GENERAL MEDICAL EXAMINATION AT A HEALTH CARE FACILITY: ICD-10-CM

## 2018-06-01 NOTE — TELEPHONE ENCOUNTER
Dr. Neida Dash / telephone  Received:  Today       Yuki YEUNG [facility]                     Patient is returning call from \"Grace\".  Best contact [phone]

## 2018-06-02 LAB
ALBUMIN SERPL-MCNC: 4.6 G/DL (ref 3.5–5.5)
ALBUMIN/GLOB SERPL: 1.8 {RATIO} (ref 1.2–2.2)
ALP SERPL-CCNC: 64 IU/L (ref 39–117)
ALT SERPL-CCNC: 13 IU/L (ref 0–44)
AST SERPL-CCNC: 16 IU/L (ref 0–40)
BASOPHILS # BLD AUTO: 0 X10E3/UL (ref 0–0.2)
BASOPHILS NFR BLD AUTO: 1 %
BILIRUB SERPL-MCNC: 0.5 MG/DL (ref 0–1.2)
BUN SERPL-MCNC: 10 MG/DL (ref 6–20)
BUN/CREAT SERPL: 11 (ref 9–20)
CALCIUM SERPL-MCNC: 9.7 MG/DL (ref 8.7–10.2)
CHLORIDE SERPL-SCNC: 101 MMOL/L (ref 96–106)
CHOLEST SERPL-MCNC: 190 MG/DL (ref 100–199)
CO2 SERPL-SCNC: 25 MMOL/L (ref 18–29)
CREAT SERPL-MCNC: 0.92 MG/DL (ref 0.76–1.27)
EOSINOPHIL # BLD AUTO: 0.3 X10E3/UL (ref 0–0.4)
EOSINOPHIL NFR BLD AUTO: 9 %
ERYTHROCYTE [DISTWIDTH] IN BLOOD BY AUTOMATED COUNT: 13 % (ref 12.3–15.4)
EST. AVERAGE GLUCOSE BLD GHB EST-MCNC: 103 MG/DL
GFR SERPLBLD CREATININE-BSD FMLA CKD-EPI: 110 ML/MIN/1.73
GFR SERPLBLD CREATININE-BSD FMLA CKD-EPI: 127 ML/MIN/1.73
GLOBULIN SER CALC-MCNC: 2.5 G/DL (ref 1.5–4.5)
GLUCOSE SERPL-MCNC: 95 MG/DL (ref 65–99)
HBA1C MFR BLD: 5.2 % (ref 4.8–5.6)
HCT VFR BLD AUTO: 44.6 % (ref 37.5–51)
HDLC SERPL-MCNC: 33 MG/DL
HGB BLD-MCNC: 15.3 G/DL (ref 13–17.7)
IMM GRANULOCYTES # BLD: 0 X10E3/UL (ref 0–0.1)
IMM GRANULOCYTES NFR BLD: 0 %
LDLC SERPL CALC-MCNC: 131 MG/DL (ref 0–99)
LYMPHOCYTES # BLD AUTO: 1.2 X10E3/UL (ref 0.7–3.1)
LYMPHOCYTES NFR BLD AUTO: 31 %
MCH RBC QN AUTO: 31.1 PG (ref 26.6–33)
MCHC RBC AUTO-ENTMCNC: 34.3 G/DL (ref 31.5–35.7)
MCV RBC AUTO: 91 FL (ref 79–97)
MONOCYTES # BLD AUTO: 0.5 X10E3/UL (ref 0.1–0.9)
MONOCYTES NFR BLD AUTO: 14 %
NEUTROPHILS # BLD AUTO: 1.8 X10E3/UL (ref 1.4–7)
NEUTROPHILS NFR BLD AUTO: 45 %
PLATELET # BLD AUTO: 130 X10E3/UL (ref 150–379)
POTASSIUM SERPL-SCNC: 4.7 MMOL/L (ref 3.5–5.2)
PROT SERPL-MCNC: 7.1 G/DL (ref 6–8.5)
RBC # BLD AUTO: 4.92 X10E6/UL (ref 4.14–5.8)
SODIUM SERPL-SCNC: 140 MMOL/L (ref 134–144)
TRIGL SERPL-MCNC: 129 MG/DL (ref 0–149)
TSH SERPL DL<=0.005 MIU/L-ACNC: 1.39 UIU/ML (ref 0.45–4.5)
VLDLC SERPL CALC-MCNC: 26 MG/DL (ref 5–40)
WBC # BLD AUTO: 3.9 X10E3/UL (ref 3.4–10.8)

## 2018-06-06 NOTE — PROGRESS NOTES
Will discuss at clinic visit on 6/11/18. Normal CMP, CBC (except platelet count 096 K, not new), A1c, and TSH. Tot chol normal at 190 (was 192 last yr), LDL high at 131 (mbr566 last yr), and HDL low at 33 (was 30 last yr).

## 2018-06-11 ENCOUNTER — OFFICE VISIT (OUTPATIENT)
Dept: INTERNAL MEDICINE CLINIC | Facility: CLINIC | Age: 33
End: 2018-06-11

## 2018-06-11 VITALS
RESPIRATION RATE: 16 BRPM | WEIGHT: 139 LBS | TEMPERATURE: 98 F | HEIGHT: 71 IN | HEART RATE: 61 BPM | SYSTOLIC BLOOD PRESSURE: 97 MMHG | BODY MASS INDEX: 19.46 KG/M2 | OXYGEN SATURATION: 98 % | DIASTOLIC BLOOD PRESSURE: 62 MMHG

## 2018-06-11 DIAGNOSIS — B36.0 TINEA VERSICOLOR: ICD-10-CM

## 2018-06-11 DIAGNOSIS — Z00.00 ROUTINE GENERAL MEDICAL EXAMINATION AT A HEALTH CARE FACILITY: Primary | ICD-10-CM

## 2018-06-11 RX ORDER — ITRACONAZOLE 100 MG/1
200 CAPSULE ORAL DAILY
Qty: 10 CAP | Refills: 0 | Status: SHIPPED | OUTPATIENT
Start: 2018-06-11 | End: 2018-06-13 | Stop reason: ALTCHOICE

## 2018-06-11 NOTE — MR AVS SNAPSHOT
[address] [address] [phone] Patient: Yasemin Pappas MRN: [id number] :1985 Visit Information Date & Time Provider Department Dept. Phone Encounter #  
 2018  7:50 AM Cynthia Arana MD [facility] [address] [id number] Follow-up Instructions Return in about 1 year (around 2019), or if symptoms worsen or fail to improve, for Annual physical exam. Upcoming Health Maintenance Date Due Influenza Age 5 to Adult 2018 DTaP/Tdap/Td series (2 - Td) 10/19/2026 Allergies as of 2018  Review Complete On: 2018 By: Cynthia Arana MD  
  
 Severity Noted Reaction Type Reactions Cucumber Fruit Extract High 05/15/2014    Anaphylaxis Fioricet [Butalbital-acetaminophen-caff]  2014    Other (comments) Current Immunizations  Never Reviewed Name Date Influenza Vaccine 2016 Tdap 10/19/2016 Not reviewed this visit You Were Diagnosed With   
  
 Codes Comments Routine general medical examination at a health care facility    -  Primary ICD-10-CM: Z00.00 ICD-9-CM: V70.0 Tinea versicolor     ICD-10-CM: B36.0 ICD-9-CM: 111.0 Vitals BP Pulse Temp Resp Height(growth percentile) Weight(growth percentile) 97/62 (BP 1 Location: Left arm, BP Patient Position: Sitting) 61 98 °F (36.7 °C) (Oral) 16 5' 11\" (1.803 m) 139 lb (63 kg) SpO2 BMI Smoking Status 98% 19.39 kg/m2 Never Smoker BMI and BSA Data Body Mass Index Body Surface Area  
 19.39 kg/m 2 1.78 m 2 Preferred Pharmacy Pharmacy Name Phone [id number] [address] [id number] [phone] Your Updated Medication List  
  
   
This list is accurate as of 18  8:21 AM.  Always use your most recent med list.  
  
  
  
  
 itraconazole 100 mg capsule Commonly known as:  WLYJEQJC Take 2 Caps by mouth daily for 5 days. Indications: TINEA VERSICOLOR Prescriptions Sent to Pharmacy Refills  
 itraconazole (SPORONAX) 100 mg capsule 0 Sig: Take 2 Caps by mouth daily for 5 days. Indications: TINEA VERSICOLOR Class: Normal  
 Pharmacy: [id number] Mumtaz Washburn, [address]  #: [phone] Route: Oral  
  
Follow-up Instructions Return in about 1 year (around 6/11/2019), or if symptoms worsen or fail to improve, for Annual physical exam.  
  
  
Patient Instructions Tinea Versicolor: Care Instructions Your Care Instructions Tinea versicolor is a skin infection caused by a yeast (fungus). It causes many small spots, usually on the chest and back. The spotted skin can be flaky or scaly. The spots do not tan in the sun, so they are lighter than the skin around them. Some spots may be darker than the skin around them. The yeast that causes tinea versicolor normally lives on your skin. But it becomes a problem only when warmth and humidity allow the yeast to grow rapidly and increase in number. Some people are more likely to get tinea versicolor. It does not spread from person to person. Tinea versicolor usually gets better as you age. You can treat tinea versicolor with cream or ointment that kills the yeast. You may need pills to kill the fungus if the spots cover a lot of your body. Although treatment kills the yeast quickly, your skin may not return to normal for months after treatment. You can get this condition again after treatment. Follow-up care is a key part of your treatment and safety. Be sure to make and go to all appointments, and call your doctor if you are having problems. It's also a good idea to know your test results and keep a list of the medicines you take. How can you care for yourself at home? · Follow the directions for use of creams, shampoos, or solutions. You will probably need to use them for 1 to 2 weeks. If your skin gets irritated, stop using the product, and call your doctor. · To prevent tinea versicolor, use a cream, shampoo, or solution one time a month. Your doctor may prescribe pills to prevent the spots from returning. · Dry off well after bathing. Keep your skin clean and dry. · Always wear sunscreen on exposed skin. Make sure to use a broad-spectrum sunscreen that has a sun protection factor (SPF) of 30 or higher. Use it every day, even when it is cloudy. · If you keep getting tinea versicolor, wash your clothes in very hot water to kill the yeast. 
When should you call for help? Call your doctor now or seek immediate medical care if: 
? · You have signs of infection such as: 
¨ Pain, warmth, or swelling in your skin. ¨ Red streaks near a wound in your skin. ¨ Pus coming from a wound in your skin. ¨ A fever. ? Watch closely for changes in your health, and be sure to contact your doctor if: 
? · Your skin condition does not improve in 2 weeks. ? · You do not get better as expected. Where can you learn more? Go to http://vanessa-jaylen.info/. Enter H173 in the search box to learn more about \"Tinea Versicolor: Care Instructions. \" Current as of: October 13, 2016 Content Version: 11.4 © 5338-3453 [employer]. Care instructions adapted under license by [employer] (which disclaims liability or warranty for this information). If you have questions about a medical condition or this instruction, always ask your healthcare professional. [address] any warranty or liability for your use of this information. Introducing [facility]! Dear Timmy Salazar: Thank you for requesting a Windation account. Our records indicate that you already have an active Windation account.   You can access your account anytime at [email or web address] Did you know that you can access your hospital and ER discharge instructions at any time in netFactor? You can also review all of your test results from your hospital stay or ER visit. Additional Information If you have questions, please visit the Frequently Asked Questions section of the netFactor website at [email or web address] Remember, netFactor is NOT to be used for urgent needs. For medical emergencies, dial 911. Now available from your iPhone and Android! Please provide this summary of care documentation to your next provider. Your primary care clinician is listed as Franci Mott. If you have any questions after today's visit, please call [phone].

## 2018-06-11 NOTE — PROGRESS NOTES
CC:   Chief Complaint   Patient presents with    Physical    Skin Problem       HISTORY OF PRESENT ILLNESS  Afshan Lange is a 28 y.o. male. Presents for physical exam.  He has migraine headaches and tinea versicolor. Today he complains of worsening tinea versicolor; has spread across back and even to groin area. Reports that acute prostatitis (hematuria and rectal pain) for which he was seen a month ago has resolved. Soc Hx  . Has 3child (10 month old with a second child de in August 2018). 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 Never smoker. Drinks 2-3 beers per week. Denies recreational drug use. Walks baby in carriage 4-5 times a week for exercise. Health Maintenance  Flu vaccine: did not get in 2017     Tetanus vaccine: 10/19/16        ROS   A complete review of systems was performed and is negative except for those mentioned in the HPI. Patient Active Problem List   Diagnosis Code    Migraine G43.909     Past Medical History:   Diagnosis Date    Headache     Headache(784.0)     Stroke ([facility] [address]      Allergies   Allergen Reactions    Cucumber Fruit Extract Anaphylaxis    Fioricet [Butalbital-Acetaminophen-Caff] Other (comments)         PHYSICAL EXAM  Visit Vitals    BP 97/62 (BP 1 Location: Left arm, BP Patient Position: Sitting)    Pulse 61    Temp 98 °F (36.7 °C) (Oral)    Resp 16    Ht 5' 11\" (1.803 m)    Wt 139 lb (63 kg)    SpO2 98%    BMI 19.39 kg/m2       General: Well-developed and well-nourished, no distress. HEENT:  Head normocephalic/atraumatic, no scleral icterus  Neck: Supple. No carotid bruits, JVD, lymphadenopathy, or thyromegaly. Lungs:  Clear to ausculation bilaterally. Good air movement. Heart:  Regular rate and rhythm, normal S1 and S2, no murmur, gallop, or rub  Abdomen: Soft, non-distended, normal bowel sounds, no tenderness, no guarding, masses, rebound tenderness, or HSM.    Skin: Hyperpigmented patches along back and chest. No scale.  Extremities: No clubbing, cyanosis, or edema. Neurological: Alert and oriented. Psychiatric: Normal mood and affect. Behavior is normal.     Results for orders placed or performed in visit on 78/58/85   METABOLIC PANEL, COMPREHENSIVE   Result Value Ref Range    Glucose 95 65 - 99 mg/dL    BUN 10 6 - 20 mg/dL    Creatinine 0.92 0.76 - 1.27 mg/dL    GFR est non- >59 mL/min/1.73    GFR est  >59 mL/min/1.73    BUN/Creatinine ratio 11 9 - 20    Sodium 140 134 - 144 mmol/L    Potassium 4.7 3.5 - 5.2 mmol/L    Chloride 101 96 - 106 mmol/L    CO2 25 18 - 29 mmol/L    Calcium 9.7 8.7 - 10.2 mg/dL    Protein, total 7.1 6.0 - 8.5 g/dL    Albumin 4.6 3.5 - 5.5 g/dL    GLOBULIN, TOTAL 2.5 1.5 - 4.5 g/dL    A-G Ratio 1.8 1.2 - 2.2    Bilirubin, total 0.5 0.0 - 1.2 mg/dL    Alk. phosphatase 64 39 - 117 IU/L    AST (SGOT) 16 0 - 40 IU/L    ALT (SGPT) 13 0 - 44 IU/L   CBC WITH AUTOMATED DIFF   Result Value Ref Range    WBC 3.9 3.4 - 10.8 x10E3/uL    RBC 4.92 4.14 - 5.80 x10E6/uL    HGB 15.3 13.0 - 17.7 g/dL    HCT 44.6 37.5 - 51.0 %    MCV 91 79 - 97 fL    MCH 31.1 26.6 - 33.0 pg    MCHC 34.3 31.5 - 35.7 g/dL    RDW 13.0 12.3 - 15.4 %    PLATELET 226 (L) 957 - 379 x10E3/uL    NEUTROPHILS 45 Not Estab. %    Lymphocytes 31 Not Estab. %    MONOCYTES 14 Not Estab. %    EOSINOPHILS 9 Not Estab. %    BASOPHILS 1 Not Estab. %    ABS. NEUTROPHILS 1.8 1.4 - 7.0 x10E3/uL    Abs Lymphocytes 1.2 0.7 - 3.1 x10E3/uL    ABS. MONOCYTES 0.5 0.1 - 0.9 x10E3/uL    ABS. EOSINOPHILS 0.3 0.0 - 0.4 x10E3/uL    ABS. BASOPHILS 0.0 0.0 - 0.2 x10E3/uL    IMMATURE GRANULOCYTES 0 Not Estab. %    ABS. IMM.  GRANS. 0.0 0.0 - 0.1 x10E3/uL   HEMOGLOBIN A1C WITH EAG   Result Value Ref Range    Hemoglobin A1c 5.2 4.8 - 5.6 %    Estimated average glucose 103 mg/dL   LIPID PANEL   Result Value Ref Range    Cholesterol, total 190 100 - 199 mg/dL    Triglyceride 129 0 - 149 mg/dL    HDL Cholesterol 33 (L) >39 mg/dL    VLDL, calculated 26 5 - 40 mg/dL    LDL, calculated 131 (H) 0 - 99 mg/dL   TSH RFX ON ABNORMAL TO FREE T4   Result Value Ref Range    TSH 1.390 0.450 - 4.500 uIU/mL         ASSESSMENT AND PLAN    ICD-10-CM ICD-9-CM    1. Routine general medical examination at a health care facility Z00.00 V70.0    2. Tinea versicolor B36.0 111.0 itraconazole (SPORONAX) 100 mg capsule     Discussed recent lab results with patient. Normal CMP, CBC (except platelet count 546 K, not new), A1c, and TSH.  Tot chol normal at 190 (was 192 last yr), LDL high at 131 (was 121 last yr), and HDL low at 33 (was 30 last yr). Diagnoses and all orders for this visit:    1. Routine general medical examination at a health care facility    2. Tinea versicolor  Differential diagnosis includes pityriasis. -     Start itraconazole (SPORONAX) 100 mg capsule; Take 2 Caps by mouth daily for 5 days. Indications: TINEA VERSICOLOR      Follow-up Disposition:  Return in about 1 year (around 6/11/2019), or if symptoms worsen or fail to improve, for Annual physical exam.      Provided patient and/or family with advanced directive information and answered pertinent questions. Encouraged patient to provide a copy of advanced directive to the office when available. I have discussed the diagnosis with the patient and the intended plan as seen in the above orders. Patient is in agreement. The patient has received an after-visit summary and questions were answered concerning future plans. I have discussed medication side effects and warnings with the patient as well.

## 2018-06-11 NOTE — PATIENT INSTRUCTIONS
Tinea Versicolor: Care Instructions  Your Care Instructions  Tinea versicolor is a skin infection caused by a yeast (fungus). It causes many small spots, usually on the chest and back. The spotted skin can be flaky or scaly. The spots do not tan in the sun, so they are lighter than the skin around them. Some spots may be darker than the skin around them. The yeast that causes tinea versicolor normally lives on your skin. But it becomes a problem only when warmth and humidity allow the yeast to grow rapidly and increase in number. Some people are more likely to get tinea versicolor. It does not spread from person to person. Tinea versicolor usually gets better as you age. You can treat tinea versicolor with cream or ointment that kills the yeast. You may need pills to kill the fungus if the spots cover a lot of your body. Although treatment kills the yeast quickly, your skin may not return to normal for months after treatment. You can get this condition again after treatment. Follow-up care is a key part of your treatment and safety. Be sure to make and go to all appointments, and call your doctor if you are having problems. It's also a good idea to know your test results and keep a list of the medicines you take. How can you care for yourself at home? · Follow the directions for use of creams, shampoos, or solutions. You will probably need to use them for 1 to 2 weeks. If your skin gets irritated, stop using the product, and call your doctor. · To prevent tinea versicolor, use a cream, shampoo, or solution one time a month. Your doctor may prescribe pills to prevent the spots from returning. · Dry off well after bathing. Keep your skin clean and dry. · Always wear sunscreen on exposed skin. Make sure to use a broad-spectrum sunscreen that has a sun protection factor (SPF) of 30 or higher. Use it every day, even when it is cloudy.   · If you keep getting tinea versicolor, wash your clothes in very hot water to kill the yeast.  When should you call for help? Call your doctor now or seek immediate medical care if:  ? · You have signs of infection such as:  ¨ Pain, warmth, or swelling in your skin. ¨ Red streaks near a wound in your skin. ¨ Pus coming from a wound in your skin. ¨ A fever. ? Watch closely for changes in your health, and be sure to contact your doctor if:  ? · Your skin condition does not improve in 2 weeks. ? · You do not get better as expected. Where can you learn more? Go to http://vanessa-jaylen.info/. Enter G293 in the search box to learn more about \"Tinea Versicolor: Care Instructions. \"  Current as of: October 13, 2016  Content Version: 11.4  © 0005-2465 [employer]. Care instructions adapted under license by [employer] (which disclaims liability or warranty for this information). If you have questions about a medical condition or this instruction, always ask your healthcare professional. [address] any warranty or liability for your use of this information.

## 2018-06-11 NOTE — PROGRESS NOTES
Eliza Cooks [place]  Identified pt with two pt identifiers(name and ). Chief Complaint   Patient presents with    Physical    Skin Problem       1. Have you been to the ER, urgent care clinic since your last visit? Hospitalized since your last visit? NO    2. Have you seen or consulted any other health care providers outside of the [address] [place] [address] since your last visit? Include any pap smears or colon screening. NO    Today's provider has been notified of reason for visit, vitals and flowsheets obtained on patients. Patient received paperwork for advance directive during previous visit but has not completed at this time     Reviewed record In preparation for visit, huddled with provider and have obtained necessary documentation      There are no preventive care reminders to display for this patient.     Wt Readings from Last 3 Encounters:   18 139 lb (63 kg)   05/10/18 138 lb (62.6 kg)   17 144 lb 12.8 oz (65.7 kg)     Temp Readings from Last 3 Encounters:   18 98 °F (36.7 °C) (Oral)   05/10/18 97.6 °F (36.4 °C) (Oral)   17 97.7 °F (36.5 °C) (Oral)     BP Readings from Last 3 Encounters:   18 97/62   05/10/18 99/59   17 121/85     Pulse Readings from Last 3 Encounters:   18 61   05/10/18 (!) 59   17 75     Vitals:    18 0800   BP: 97/62   Pulse: 61   Resp: 16   Temp: 98 °F (36.7 °C)   TempSrc: Oral   SpO2: 98%   Weight: 139 lb (63 kg)   Height: 5' 11\" (1.803 m)   PainSc:   0 - No pain         Learning Assessment:  :     Learning Assessment 2016   PRIMARY LEARNER Patient   HIGHEST LEVEL OF EDUCATION - PRIMARY LEARNER  > 4 YEARS OF COLLEGE   BARRIERS PRIMARY LEARNER NONE   CO-LEARNER CAREGIVER No   PRIMARY LANGUAGE ENGLISH   LEARNER PREFERENCE PRIMARY READING   ANSWERED BY patient   RELATIONSHIP SELF       Depression Screening:  :     PHQ over the last two weeks 2018   Little interest or pleasure in doing things Not at all   Feeling down, depressed or hopeless Not at all   Total Score PHQ 2 0       Fall Risk Assessment:  :     No flowsheet data found. Abuse Screening:  :     No flowsheet data found. ADL Screening:  :     ADL Assessment 5/10/2018   Feeding yourself No Help Needed   Getting from bed to chair No Help Needed   Getting dressed No Help Needed   Bathing or showering No Help Needed   Walk across the room (includes cane/walker) No Help Needed   Using the telphone No Help Needed   Taking your medications No Help Needed   Preparing meals No Help Needed   Managing money (expenses/bills) No Help Needed   Moderately strenuous housework (laundry) No Help Needed   Shopping for personal items (toiletries/medicines) No Help Needed   Shopping for groceries No Help Needed   Driving No Help Needed   Climbing a flight of stairs No Help Needed   Getting to places beyond walking distances No Help Needed                 Medication reconciliation up to date and corrected with patient at this time.

## 2018-06-12 ENCOUNTER — TELEPHONE (OUTPATIENT)
Dept: INTERNAL MEDICINE CLINIC | Facility: CLINIC | Age: 33
End: 2018-06-12

## 2018-06-12 DIAGNOSIS — B36.0 TINEA VERSICOLOR: Primary | ICD-10-CM

## 2018-06-13 PROBLEM — B36.0 TINEA VERSICOLOR: Status: ACTIVE | Noted: 2018-06-13

## 2018-06-13 RX ORDER — FLUCONAZOLE 150 MG/1
300 TABLET ORAL
Qty: 4 TAB | Refills: 0 | Status: SHIPPED | OUTPATIENT
Start: 2018-06-13 | End: 2018-06-21

## 2018-06-13 NOTE — TELEPHONE ENCOUNTER
Please inform patient that a new prescription was sent to [facility] to replace itraconazole, the medication his insurance did not cover.

## 2018-06-30 ENCOUNTER — OFFICE VISIT (OUTPATIENT)
Dept: URGENT CARE | Age: 33
End: 2018-06-30

## 2018-06-30 VITALS
TEMPERATURE: 97.1 F | SYSTOLIC BLOOD PRESSURE: 122 MMHG | WEIGHT: 139 LBS | BODY MASS INDEX: 19.46 KG/M2 | RESPIRATION RATE: 18 BRPM | DIASTOLIC BLOOD PRESSURE: 72 MMHG | HEART RATE: 87 BPM | HEIGHT: 71 IN | OXYGEN SATURATION: 99 %

## 2018-06-30 DIAGNOSIS — R19.7 DIARRHEA, UNSPECIFIED TYPE: ICD-10-CM

## 2018-06-30 DIAGNOSIS — R11.2 NAUSEA AND VOMITING, INTRACTABILITY OF VOMITING NOT SPECIFIED, UNSPECIFIED VOMITING TYPE: Primary | ICD-10-CM

## 2018-06-30 RX ORDER — ONDANSETRON 4 MG/1
4 TABLET, ORALLY DISINTEGRATING ORAL
Qty: 10 TAB | Refills: 0 | Status: SHIPPED | OUTPATIENT
Start: 2018-06-30 | End: 2019-12-14

## 2018-06-30 NOTE — PATIENT INSTRUCTIONS
This is likely viral gastroenteritis  Go to Emergency Department immediately for any new or worsening, if you arent keeping fluids down with help of zofran, of if symptoms last longer than next 2-3 days. Should see your PCP on Monday (48 hours) for re eval.    Gastroenteritis: Care Instructions  Your Care Instructions    Gastroenteritis is an illness that may cause nausea, vomiting, and diarrhea. It is sometimes called \"stomach flu. \" It can be caused by bacteria or a virus. You will probably begin to feel better in 1 to 2 days. In the meantime, get plenty of rest and make sure you do not become dehydrated. Dehydration occurs when your body loses too much fluid. Follow-up care is a key part of your treatment and safety. Be sure to make and go to all appointments, and call your doctor if you are having problems. It's also a good idea to know your test results and keep a list of the medicines you take. How can you care for yourself at home? · If your doctor prescribed antibiotics, take them as directed. Do not stop taking them just because you feel better. You need to take the full course of antibiotics. · Drink plenty of fluids to prevent dehydration, enough so that your urine is light yellow or clear like water. Choose water and other caffeine-free clear liquids until you feel better. If you have kidney, heart, or liver disease and have to limit fluids, talk with your doctor before you increase your fluid intake. · Drink fluids slowly, in frequent, small amounts, because drinking too much too fast can cause vomiting. · Begin eating mild foods, such as dry toast, yogurt, applesauce, bananas, and rice. Avoid spicy, hot, or high-fat foods, and do not drink alcohol or caffeine for a day or two. Do not drink milk or eat ice cream until you are feeling better. How to prevent gastroenteritis  · Keep hot foods hot and cold foods cold.   · Do not eat meats, dressings, salads, or other foods that have been kept at room temperature for more than 2 hours. · Use a thermometer to check your refrigerator. It should be between 34°F and 40°F.  · Defrost meats in the refrigerator or microwave, not on the kitchen counter. · Keep your hands and your kitchen clean. Wash your hands, cutting boards, and countertops with hot soapy water frequently. · Cook meat until it is well done. · Do not eat raw eggs or uncooked sauces made with raw eggs. · Do not take chances. If food looks or tastes spoiled, throw it out. When should you call for help? Call 911 anytime you think you may need emergency care. For example, call if:  ? · You vomit blood or what looks like coffee grounds. ? · You passed out (lost consciousness). ? · You pass maroon or very bloody stools. ?Call your doctor now or seek immediate medical care if:  ? · You have severe belly pain. ? · You have signs of needing more fluids. You have sunken eyes, a dry mouth, and pass only a little dark urine. ? · You feel like you are going to faint. ? · You have increased belly pain that does not go away in 1 to 2 days. ? · You have new or increased nausea, or you are vomiting. ? · You have a new or higher fever. ? · Your stools are black and tarlike or have streaks of blood. ? Watch closely for changes in your health, and be sure to contact your doctor if:  ? · You are dizzy or lightheaded. ? · You urinate less than usual, or your urine is dark yellow or brown. ? · You do not feel better with each day that goes by. Where can you learn more? Go to http://vanessa-jaylen.info/. Enter N142 in the search box to learn more about \"Gastroenteritis: Care Instructions. \"  Current as of: March 3, 2017  Content Version: 11.4  © 5741-9106 [employer]. Care instructions adapted under license by [employer] (which disclaims liability or warranty for this information).  If you have questions about a medical condition or this instruction, always ask your healthcare professional. [address] [address] any warranty or liability for your use of this information.

## 2018-06-30 NOTE — MR AVS SNAPSHOT
[address] [facility] [address] 
[phone] Patient: Carroll Whitley MRN: [id number] :1985 Visit Information Date & Time Provider Department Dept. Phone Encounter #  
 2018  9:30 AM [facility] [phone] [id number] Upcoming Health Maintenance Date Due Influenza Age 5 to Adult 2018 DTaP/Tdap/Td series (2 - Td) 10/19/2026 Allergies as of 2018  Review Complete On: 2018 By: Ala Homans, RN Severity Noted Reaction Type Reactions Cucumber Fruit Extract High 05/15/2014    Anaphylaxis Fioricet [Butalbital-acetaminophen-caff]  2014    Other (comments) Current Immunizations  Never Reviewed Name Date Influenza Vaccine 2016 Tdap 10/19/2016 Not reviewed this visit You Were Diagnosed With   
  
 Codes Comments Nausea and vomiting, intractability of vomiting not specified, unspecified vomiting type    -  Primary ICD-10-CM: R11.2 ICD-9-CM: 787.01 Diarrhea, unspecified type     ICD-10-CM: R19.7 ICD-9-CM: 787.91 Vitals BP Pulse Temp Resp Height(growth percentile) Weight(growth percentile) 122/72 87 97.1 °F (36.2 °C) 18 5' 11\" (1.803 m) 139 lb (63 kg) SpO2 BMI Smoking Status 99% 19.39 kg/m2 Never Smoker BMI and BSA Data Body Mass Index Body Surface Area  
 19.39 kg/m 2 1.78 m 2 Preferred Pharmacy Pharmacy Name Phone [id number] [address] [address] [phone] Your Updated Medication List  
  
   
This list is accurate as of 18 10:58 AM.  Always use your most recent med list.  
  
  
  
  
 ondansetron 4 mg disintegrating tablet Commonly known as:  ZOFRAN ODT Take 1 Tab by mouth every eight (8) hours as needed for Nausea. Prescriptions Sent to Pharmacy Refills ondansetron (ZOFRAN ODT) 4 mg disintegrating tablet 0 Sig: Take 1 Tab by mouth every eight (8) hours as needed for Nausea. Class: Normal  
 Pharmacy: [id number] Trina Aburto Ph #: [phone] Route: Oral  
  
Patient Instructions This is likely viral gastroenteritis Go to Emergency Department immediately for any new or worsening, if you arent keeping fluids down with help of zofran, of if symptoms last longer than next 2-3 days. Should see your PCP on Monday (48 hours) for re eval. 
 
Gastroenteritis: Care Instructions Your Care Instructions Gastroenteritis is an illness that may cause nausea, vomiting, and diarrhea. It is sometimes called \"stomach flu. \" It can be caused by bacteria or a virus. You will probably begin to feel better in 1 to 2 days. In the meantime, get plenty of rest and make sure you do not become dehydrated. Dehydration occurs when your body loses too much fluid. Follow-up care is a key part of your treatment and safety. Be sure to make and go to all appointments, and call your doctor if you are having problems. It's also a good idea to know your test results and keep a list of the medicines you take. How can you care for yourself at home? · If your doctor prescribed antibiotics, take them as directed. Do not stop taking them just because you feel better. You need to take the full course of antibiotics. · Drink plenty of fluids to prevent dehydration, enough so that your urine is light yellow or clear like water. Choose water and other caffeine-free clear liquids until you feel better. If you have kidney, heart, or liver disease and have to limit fluids, talk with your doctor before you increase your fluid intake. · Drink fluids slowly, in frequent, small amounts, because drinking too much too fast can cause vomiting.  
· Begin eating mild foods, such as dry toast, yogurt, applesauce, bananas, and rice. Avoid spicy, hot, or high-fat foods, and do not drink alcohol or caffeine for a day or two. Do not drink milk or eat ice cream until you are feeling better. How to prevent gastroenteritis · Keep hot foods hot and cold foods cold. · Do not eat meats, dressings, salads, or other foods that have been kept at room temperature for more than 2 hours. · Use a thermometer to check your refrigerator. It should be between 34°F and 40°F. 
· Defrost meats in the refrigerator or microwave, not on the kitchen counter. · Keep your hands and your kitchen clean. Wash your hands, cutting boards, and countertops with hot soapy water frequently. · Cook meat until it is well done. · Do not eat raw eggs or uncooked sauces made with raw eggs. · Do not take chances. If food looks or tastes spoiled, throw it out. When should you call for help? Call 911 anytime you think you may need emergency care. For example, call if: 
? · You vomit blood or what looks like coffee grounds. ? · You passed out (lost consciousness). ? · You pass maroon or very bloody stools. ?Call your doctor now or seek immediate medical care if: 
? · You have severe belly pain. ? · You have signs of needing more fluids. You have sunken eyes, a dry mouth, and pass only a little dark urine. ? · You feel like you are going to faint. ? · You have increased belly pain that does not go away in 1 to 2 days. ? · You have new or increased nausea, or you are vomiting. ? · You have a new or higher fever. ? · Your stools are black and tarlike or have streaks of blood. ? Watch closely for changes in your health, and be sure to contact your doctor if: 
? · You are dizzy or lightheaded. ? · You urinate less than usual, or your urine is dark yellow or brown. ? · You do not feel better with each day that goes by. Where can you learn more? Go to http://matteo.info/. Enter N142 in the search box to learn more about \"Gastroenteritis: Care Instructions. \" Current as of: March 3, 2017 Content Version: 11.4 © 7138-8937 [facility]. Care instructions adapted under license by [employer] (which disclaims liability or warranty for this information). If you have questions about a medical condition or this instruction, always ask your healthcare professional. [address] any warranty or liability for your use of this information. Introducing [facility]! Dear Rebel Eng: Thank you for requesting a [employer] account. Our records indicate that you already have an active [employer] account. You can access your account anytime at [email or web address] Did you know that you can access your hospital and ER discharge instructions at any time in [employer]? You can also review all of your test results from your hospital stay or ER visit. Additional Information If you have questions, please visit the Frequently Asked Questions section of the [employer] website at [email or web address] Remember, [employer] is NOT to be used for urgent needs. For medical emergencies, dial 911. Now available from your iPhone and Android! Please provide this summary of care documentation to your next provider. Your primary care clinician is listed as Malcolm Bean. If you have any questions after today's visit, please call [phone].

## 2018-06-30 NOTE — PROGRESS NOTES
HPI Comments:   Here for nausea, vomiting, diarreha. Onset yesterday with crampy stomach 0/10 pain. Felt nauseated and vomited x 3 total.  Onset of diarrhea today with episode x 1. His 2 family members have identical symptoms. No fever, chills, dizziness, rashes or urinary symptoms. Has had decreased appetite today. Has vomited up fluids this morning. Denies: pus, mucus, blood in stool or vomit. No recent travel or consumption of water from unsafe water source. Has not tried any meds. Overall unchanged since onset. Patient is a 35 y.o. male presenting with nausea. Nausea   Pertinent negatives include no shortness of breath. Past Medical History:   Diagnosis Date    Headache     Headache(784.0)     Stroke [facility])         Past Surgical History:   Procedure Laterality Date    HX HEENT      wisdom teeth         Family History   Problem Relation Age of Onset    Elevated Lipids Father     Lung Disease Paternal Grandfather     Cancer Paternal Grandfather      prostate/lung ?  Diabetes Maternal Grandmother     Heart Disease Maternal Grandfather     Heart Disease Paternal Grandmother     No Known Problems Brother         Social History     Social History    Marital status:      Spouse name: N/A    Number of children: N/A    Years of education: N/A     Occupational History    Not on file. Social History Main Topics    Smoking status: Never Smoker    Smokeless tobacco: Never Used    Alcohol use 0.5 oz/week     1 Cans of beer per week      Comment: 1can per week    Drug use: No    Sexual activity: No     Other Topics Concern    Not on file     Social History Narrative                ALLERGIES: Cucumber fruit extract and Fioricet [butalbital-acetaminophen-caff]    Review of Systems   Constitutional: Negative for fever. Respiratory: Negative for shortness of breath. Gastrointestinal: Positive for nausea. Endocrine: Negative for polyuria.    Genitourinary: Negative for decreased urine volume and dysuria. Neurological: Negative for dizziness, syncope and weakness. All other systems reviewed and are negative. Vitals:    06/30/18 1002   BP: 122/72   Pulse: 87   Resp: 18   Temp: 97.1 °F (36.2 °C)   SpO2: 99%   Weight: 139 lb (63 kg)   Height: 5' 11\" (1.803 m)       Physical Exam   Constitutional: He is oriented to person, place, and time. No distress. Appears to not feel well but does not look toxic or severely ill. HENT:   Mouth/Throat: Oropharynx is clear and moist. No oropharyngeal exudate. Eyes: Conjunctivae and EOM are normal. Pupils are equal, round, and reactive to light. Neck: Normal range of motion. Neck supple. Cardiovascular: Normal rate, regular rhythm and normal heart sounds. Exam reveals no gallop and no friction rub. No murmur heard. Pulmonary/Chest: Effort normal and breath sounds normal. No respiratory distress. He has no wheezes. He has no rales. Abdominal: Soft. Bowel sounds are normal. He exhibits no distension and no mass. There is no guarding. Very mild generalized tenderness to moderate palpation (non focal). No rebound tenderness. Lymphadenopathy:     He has no cervical adenopathy. Neurological: He is alert and oriented to person, place, and time. No cranial nerve deficit. Skin: Skin is warm and dry. No rash noted. No pallor. Psychiatric: He has a normal mood and affect. His behavior is normal. Thought content normal.       MDM     Differential Diagnosis; Clinical Impression; Plan:       CLINICAL IMPRESSION:  (R11.2) Nausea and vomiting, intractability of vomiting not specified, unspecified vomiting type  (primary encounter diagnosis)  (R19.7) Diarrhea, unspecified type    Orders Placed This Encounter      ondansetron (ZOFRAN ODT) 4 mg disintegrating tablet    Likely viral Gastroenteritis given family members have identical symptoms. Plan:  1. See above orders  2. Maintain adequate fluid intake  3. Review handouts  4.  Easy to digest foods. We have reviewed concerning signs/symptoms, normal vs abnormal progression of medical condition and when to seek immediate medical attention. Schedule with PCP or Urgent Care immediately for worsening or new symptoms. See your PCP if there is not at least some improvement in symptoms within the next 48-72 hours. You should see your PCP for updates on your routine health maintenance.        Risk of Significant Complications, Morbidity, and/or Mortality:   Presenting problems:  Low  Diagnostic procedures:  Low  Management options:  Low  Progress:   Patient progress:  Stable      Procedures

## 2019-12-14 ENCOUNTER — OFFICE VISIT (OUTPATIENT)
Dept: URGENT CARE | Age: 34
End: 2019-12-14

## 2019-12-14 VITALS
SYSTOLIC BLOOD PRESSURE: 114 MMHG | DIASTOLIC BLOOD PRESSURE: 66 MMHG | BODY MASS INDEX: 19.74 KG/M2 | WEIGHT: 141 LBS | OXYGEN SATURATION: 97 % | RESPIRATION RATE: 16 BRPM | TEMPERATURE: 98.8 F | HEIGHT: 71 IN | HEART RATE: 69 BPM

## 2019-12-14 DIAGNOSIS — M54.50 ACUTE BILATERAL LOW BACK PAIN WITHOUT SCIATICA: Primary | ICD-10-CM

## 2019-12-14 RX ORDER — BACLOFEN 20 MG/1
20 TABLET ORAL 2 TIMES DAILY
Qty: 15 TAB | Refills: 0 | Status: SHIPPED | OUTPATIENT
Start: 2019-12-14 | End: 2021-05-10

## 2019-12-14 RX ORDER — NAPROXEN 500 MG/1
500 TABLET ORAL 2 TIMES DAILY WITH MEALS
Qty: 20 TAB | Refills: 0 | Status: SHIPPED | OUTPATIENT
Start: 2019-12-14 | End: 2021-05-10

## 2019-12-14 NOTE — PROGRESS NOTES
Back Pain    This is a new problem. The current episode started 3 to 5 hours ago (this morning ). The problem has not changed since onset. The problem occurs constantly. The pain is associated with lifting (bending over ). The pain is present in the lower back and generalized. The quality of the pain is described as aching. The pain is at a severity of 6/10. The pain is moderate. The symptoms are aggravated by twisting and bending. The pain is worse during the day. Pertinent negatives include no numbness, no leg pain and no tingling. He has tried nothing for the symptoms. Risk factors include lack of exercise, a sedentary lifestyle and poor posture. Past Medical History:   Diagnosis Date    Headache     Headache(784.0)     Stroke [facility])         Past Surgical History:   Procedure Laterality Date    HX HEENT      wisdom teeth         Family History   Problem Relation Age of Onset    Elevated Lipids Father     Lung Disease Paternal Grandfather     Cancer Paternal Grandfather         prostate/lung ?  Diabetes Maternal Grandmother     Heart Disease Maternal Grandfather     Heart Disease Paternal Grandmother     No Known Problems Brother         Social History     Socioeconomic History    Marital status:      Spouse name: Not on file    Number of children: Not on file    Years of education: Not on file    Highest education level: Not on file   Occupational History    Not on file   Social Needs    Financial resource strain: Not on file    Food insecurity:     Worry: Not on file     Inability: Not on file    Transportation needs:     Medical: Not on file     Non-medical: Not on file   Tobacco Use    Smoking status: Never Smoker    Smokeless tobacco: Never Used   Substance and Sexual Activity    Alcohol use:  Yes     Alcohol/week: 0.8 standard drinks     Types: 1 Cans of beer per week     Comment: 1can per week    Drug use: No    Sexual activity: Never     Partners: Female   Lifestyle  Physical activity:     Days per week: Not on file     Minutes per session: Not on file    Stress: Not on file   Relationships    Social connections:     Talks on phone: Not on file     Gets together: Not on file     Attends Synagogue service: Not on file     Active member of club or organization: Not on file     Attends meetings of clubs or organizations: Not on file     Relationship status: Not on file    Intimate partner violence:     Fear of current or ex partner: Not on file     Emotionally abused: Not on file     Physically abused: Not on file     Forced sexual activity: Not on file   Other Topics Concern    Not on file   Social History Narrative    Not on file                ALLERGIES: Cucumber fruit extract and Fioricet [butalbital-acetaminophen-caff]    Review of Systems   Musculoskeletal: Positive for back pain. Negative for gait problem and neck pain. Neurological: Negative for tingling and numbness. All other systems reviewed and are negative. Vitals:    12/14/19 1454   BP: 114/66   Pulse: 69   Resp: 16   Temp: 98.8 °F (37.1 °C)   SpO2: 97%   Weight: 141 lb (64 kg)   Height: 5' 11\" (1.803 m)       Physical Exam  Vitals signs and nursing note reviewed. Constitutional:       General: He is not in acute distress. Musculoskeletal:      Cervical back: Normal.      Thoracic back: Normal.      Lumbar back: He exhibits decreased range of motion, tenderness, pain and spasm. He exhibits no swelling and no deformity. Back:      not able to stand straight- gait normal     MDM    Procedures        ICD-10-CM ICD-9-CM    1. Acute bilateral low back pain without sciatica M54.5 724.2      338.19      Medications Ordered Today   Medications    baclofen (LIORESAL) 20 mg tablet     Sig: Take 1 Tab by mouth two (2) times a day. Dispense:  15 Tab     Refill:  0    naproxen (NAPROSYN) 500 mg tablet     Sig: Take 1 Tab by mouth two (2) times daily (with meals).      Dispense:  20 Tab     Refill: 0     No results found for any visits on 12/14/19. The patients condition was discussed with the patient and they understand. The patient is to follow up with primary care doctor. If signs and symptoms become worse the pt is to go to the ER. The patient is to take medications as prescribed. ICD-10-CM ICD-9-CM    1. Acute bilateral low back pain without sciatica M54.5 724.2      338.19      Medications Ordered Today   Medications    baclofen (LIORESAL) 20 mg tablet     Sig: Take 1 Tab by mouth two (2) times a day. Dispense:  15 Tab     Refill:  0    naproxen (NAPROSYN) 500 mg tablet     Sig: Take 1 Tab by mouth two (2) times daily (with meals). Dispense:  20 Tab     Refill:  0     No results found for any visits on 12/14/19. The patients condition was discussed with the patient and they understand. The patient is to follow up with primary care doctor. If signs and symptoms become worse the pt is to go to the ER. The patient is to take medications as prescribed.

## 2019-12-14 NOTE — PATIENT INSTRUCTIONS

## 2020-07-31 ENCOUNTER — EMPLOYEE WELLNESS (OUTPATIENT)
Dept: OTHER | Facility: CLINIC | Age: 35
End: 2020-07-31

## 2020-10-14 LAB
CHOLEST SERPL-MCNC: 219 MG/DL
GLUCOSE SERPL-MCNC: 89 MG/DL (ref 65–100)
HDLC SERPL-MCNC: 31 MG/DL
LDLC SERPL CALC-MCNC: 162.6 MG/DL (ref 0–100)
TRIGL SERPL-MCNC: 127 MG/DL (ref ?–150)

## 2021-05-10 ENCOUNTER — OFFICE VISIT (OUTPATIENT)
Dept: INTERNAL MEDICINE CLINIC | Age: 36
End: 2021-05-10
Payer: COMMERCIAL

## 2021-05-10 VITALS
DIASTOLIC BLOOD PRESSURE: 77 MMHG | TEMPERATURE: 98.1 F | SYSTOLIC BLOOD PRESSURE: 118 MMHG | BODY MASS INDEX: 20.93 KG/M2 | HEART RATE: 65 BPM | HEIGHT: 71 IN | RESPIRATION RATE: 12 BRPM | OXYGEN SATURATION: 96 % | WEIGHT: 149.5 LBS

## 2021-05-10 DIAGNOSIS — J45.30 MILD PERSISTENT ASTHMA WITHOUT COMPLICATION: Primary | ICD-10-CM

## 2021-05-10 PROCEDURE — 99213 OFFICE O/P EST LOW 20 MIN: CPT | Performed by: PHYSICIAN ASSISTANT

## 2021-05-10 RX ORDER — FLUTICASONE PROPIONATE 44 UG/1
1 AEROSOL, METERED RESPIRATORY (INHALATION) 2 TIMES DAILY
Qty: 1 INHALER | Refills: 5 | Status: SHIPPED | OUTPATIENT
Start: 2021-05-10 | End: 2022-02-15

## 2021-05-10 RX ORDER — FLUCONAZOLE 150 MG/1
TABLET ORAL
COMMUNITY
Start: 2021-05-02 | End: 2021-05-31

## 2021-05-10 NOTE — PROGRESS NOTES
Margot Jesusjony  Identified pt with two pt identifiers(name and ). Chief Complaint   Patient presents with    Asthma       Reviewed record In preparation for visit and have obtained necessary documentation. 1. Have you been to the ER, urgent care clinic or hospitalized since your last visit? No     2. Have you seen or consulted any other health care providers outside of the [address] [place] [address] since your last visit? Include any pap smears or colon screening. No    Vitals reviewed with provider. Health Maintenance reviewed:     Health Maintenance Due   Topic    Hepatitis C Screening     COVID-19 Vaccine (1)          Wt Readings from Last 3 Encounters:   05/10/21 149 lb 8 oz (67.8 kg)   19 141 lb (64 kg)   18 139 lb (63 kg)        Temp Readings from Last 3 Encounters:   05/10/21 98.1 °F (36.7 °C) (Oral)   19 98.8 °F (37.1 °C)   18 97.1 °F (36.2 °C)        BP Readings from Last 3 Encounters:   05/10/21 118/77   19 114/66   18 122/72        Pulse Readings from Last 3 Encounters:   05/10/21 65   19 69   18 87        Vitals:    05/10/21 0813   BP: 118/77   Pulse: 65   Resp: 12   Temp: 98.1 °F (36.7 °C)   TempSrc: Oral   SpO2: 96%   Weight: 149 lb 8 oz (67.8 kg)   Height: 5' 11\" (1.803 m)   PainSc:   0 - No pain          Learning Assessment:   :       Learning Assessment 2016   PRIMARY LEARNER Patient   HIGHEST LEVEL OF EDUCATION - PRIMARY LEARNER  > 4 YEARS OF COLLEGE   BARRIERS PRIMARY LEARNER NONE   CO-LEARNER CAREGIVER No   PRIMARY LANGUAGE ENGLISH   LEARNER PREFERENCE PRIMARY READING   ANSWERED BY patient   RELATIONSHIP SELF        Depression Screening:   :       3 most recent PHQ Screens 5/10/2021   Little interest or pleasure in doing things Not at all   Feeling down, depressed, irritable, or hopeless Not at all   Total Score PHQ 2 0        Fall Risk Assessment:   :     No flowsheet data found.      Abuse Screening:   :     No flowsheet data found.      ADL Screening:   :       ADL Assessment 5/10/2018   Feeding yourself No Help Needed   Getting from bed to chair No Help Needed   Getting dressed No Help Needed   Bathing or showering No Help Needed   Walk across the room (includes cane/walker) No Help Needed   Using the telphone No Help Needed   Taking your medications No Help Needed   Preparing meals No Help Needed   Managing money (expenses/bills) No Help Needed   Moderately strenuous housework (laundry) No Help Needed   Shopping for personal items (toiletries/medicines) No Help Needed   Shopping for groceries No Help Needed   Driving No Help Needed   Climbing a flight of stairs No Help Needed   Getting to places beyond walking distances No Help Needed

## 2021-05-10 NOTE — PROGRESS NOTES
Nika Palacios is a 28y.o. year old male seen in clinic today for   Chief Complaint   Patient presents with    Asthma       he presents with new onset palpitations related to new Advair Rx. He notes he went to an Urgent Care after his asthma was flaring up with daily wheezing and coughing and was prescribed 100 mcg Advair Diskus. He notes upon starting it twice a day he began to have almost daily intermittent episodes of palpitations. He was concerned about his HR until he realized the cause and stopped the advair. He notes he would still like a treatment for his asthma. He notes he had been on Advair before but not consistently. He denies any other issues at this time. No anxiety currently. Current Outpatient Medications on File Prior to Visit   Medication Sig Dispense Refill    fluconazole (DIFLUCAN) 150 mg tablet TAKE 2 TABLETS BY MOUTH EVERY WEEK FOR 2 WEEKS. MAY REPEAT 2 MORE ROUNDS IF NEEDED.  baclofen (LIORESAL) 20 mg tablet Take 1 Tab by mouth two (2) times a day. 15 Tab 0    naproxen (NAPROSYN) 500 mg tablet Take 1 Tab by mouth two (2) times daily (with meals). 20 Tab 0     No current facility-administered medications on file prior to visit. Allergies   Allergen Reactions    Cucumber Fruit Extract Anaphylaxis    Fioricet [Butalbital-Acetaminophen-Caff] Other (comments)     Past Medical History:   Diagnosis Date    Headache     Headache(784.0)     Stroke [facility])       Past Surgical History:   Procedure Laterality Date    HX HEENT      wisdom teeth        Family History   Problem Relation Age of Onset    Elevated Lipids Father     Lung Disease Paternal Grandfather     Cancer Paternal Grandfather         prostate/lung ?     Diabetes Maternal Grandmother     Heart Disease Maternal Grandfather     Heart Disease Paternal Grandmother     No Known Problems Brother         Social History     Socioeconomic History    Marital status:      Spouse name: Not on file    Number of children: Not on file    Years of education: Not on file    Highest education level: Not on file   Occupational History    Not on file   Social Needs    Financial resource strain: Not on file    Food insecurity     Worry: Not on file     Inability: Not on file    Transportation needs     Medical: Not on file     Non-medical: Not on file   Tobacco Use    Smoking status: Never Smoker    Smokeless tobacco: Never Used   Substance and Sexual Activity    Alcohol use: Yes     Alcohol/week: 0.8 standard drinks     Types: 1 Cans of beer per week     Comment: 1can per week    Drug use: No    Sexual activity: Never     Partners: Female   Lifestyle    Physical activity     Days per week: Not on file     Minutes per session: Not on file    Stress: Not on file   Relationships    Social connections     Talks on phone: Not on file     Gets together: Not on file     Attends Mu-ism service: Not on file     Active member of club or organization: Not on file     Attends meetings of clubs or organizations: Not on file     Relationship status: Not on file    Intimate partner violence     Fear of current or ex partner: Not on file     Emotionally abused: Not on file     Physically abused: Not on file     Forced sexual activity: Not on file   Other Topics Concern    Not on file   Social History Narrative    Not on file           Visit Vitals  /77 (BP 1 Location: Left upper arm, BP Patient Position: Sitting)   Pulse 65   Temp 98.1 °F (36.7 °C) (Oral)   Resp 12   Ht 5' 11\" (1.803 m)   Wt 149 lb 8 oz (67.8 kg)   SpO2 96%   BMI 20.85 kg/m²       Review of Systems   Constitutional: Negative for chills, fever, malaise/fatigue and weight loss. Respiratory: Positive for cough and wheezing. Negative for shortness of breath. Cardiovascular: Positive for palpitations. Negative for chest pain and leg swelling.    Gastrointestinal: Negative for abdominal pain, blood in stool, constipation, diarrhea, heartburn, melena, nausea and vomiting. Genitourinary: Negative for dysuria and frequency. Musculoskeletal: Negative for myalgias. Skin: Negative for rash. Neurological: Negative for dizziness, weakness and headaches. Endo/Heme/Allergies: Does not bruise/bleed easily. Psychiatric/Behavioral: Negative for depression. All other systems reviewed and are negative. Physical Exam  Constitutional:       Appearance: Normal appearance. He is not diaphoretic. HENT:      Head: Normocephalic and atraumatic. Nose: Nose normal.      Mouth/Throat:      Mouth: Mucous membranes are moist.   Eyes:      Conjunctiva/sclera: Conjunctivae normal.   Neck:      Musculoskeletal: Neck supple. Cardiovascular:      Rate and Rhythm: Normal rate and regular rhythm. Pulses: Normal pulses. Heart sounds: No murmur. Pulmonary:      Effort: Pulmonary effort is normal. No respiratory distress. Breath sounds: Normal breath sounds. No stridor. No wheezing, rhonchi or rales. Skin:     General: Skin is warm and dry. Neurological:      General: No focal deficit present. Mental Status: He is alert and oriented to person, place, and time. Psychiatric:         Mood and Affect: Mood normal.         Behavior: Behavior normal.           ASSESSMENT AND PLAN:  Diagnoses and all orders for this visit:    1. Mild persistent asthma without complication  -     fluticasone propionate (FLOVENT HFA) 44 mcg/actuation inhaler; Take 1 Puff by inhalation two (2) times a day. Discontinue Advair and begin low dose Flovent. Discussed use with patient and potential side effects. Will begin with one puff in AM and increase to BID if no resolution. Maximizing 2 puffs BID. Return to clinic if no improvement. I have discussed the diagnosis with the patient and the intended plan as seen in the above orders. Patient is in agreement. The patient has received an after-visit summary and questions were answered concerning future plans.   I have discussed medication side effects and warnings with the patient as well.     Ariana Martin PA-C

## 2021-05-10 NOTE — PATIENT INSTRUCTIONS
Begin Flovent once daily in AM. Adjust to twice daily. Can increase up to 2 puffs twice daily for symptomatic relief. Monitor palpitation side effects.  Return for adjusted treatment if no improvement to side effects

## 2021-09-17 LAB
CHOLEST SERPL-MCNC: 226 MG/DL
GLUCOSE SERPL-MCNC: 93 MG/DL (ref 65–100)
HDLC SERPL-MCNC: 39 MG/DL
LDLC SERPL CALC-MCNC: 153.4 MG/DL (ref 0–100)
TRIGL SERPL-MCNC: 168 MG/DL (ref ?–150)

## 2022-02-15 ENCOUNTER — OFFICE VISIT (OUTPATIENT)
Dept: INTERNAL MEDICINE CLINIC | Age: 37
End: 2022-02-15
Payer: COMMERCIAL

## 2022-02-15 VITALS
HEART RATE: 81 BPM | WEIGHT: 155.5 LBS | HEIGHT: 71 IN | RESPIRATION RATE: 12 BRPM | TEMPERATURE: 97.9 F | BODY MASS INDEX: 21.77 KG/M2 | SYSTOLIC BLOOD PRESSURE: 138 MMHG | DIASTOLIC BLOOD PRESSURE: 84 MMHG | OXYGEN SATURATION: 95 %

## 2022-02-15 DIAGNOSIS — M54.41 ACUTE RIGHT-SIDED LOW BACK PAIN WITH BILATERAL SCIATICA: Primary | ICD-10-CM

## 2022-02-15 DIAGNOSIS — Z00.00 ROUTINE GENERAL MEDICAL EXAMINATION AT A HEALTH CARE FACILITY: ICD-10-CM

## 2022-02-15 DIAGNOSIS — M54.42 ACUTE RIGHT-SIDED LOW BACK PAIN WITH BILATERAL SCIATICA: Primary | ICD-10-CM

## 2022-02-15 PROCEDURE — 99213 OFFICE O/P EST LOW 20 MIN: CPT | Performed by: INTERNAL MEDICINE

## 2022-02-15 RX ORDER — CYCLOBENZAPRINE HCL 10 MG
10 TABLET ORAL
Qty: 10 TABLET | Refills: 0 | Status: SHIPPED | OUTPATIENT
Start: 2022-02-15 | End: 2022-02-25

## 2022-02-15 RX ORDER — METHYLPREDNISOLONE 4 MG/1
TABLET ORAL
Qty: 1 DOSE PACK | Refills: 0 | Status: SHIPPED | OUTPATIENT
Start: 2022-02-15

## 2022-02-15 NOTE — PROGRESS NOTES
Cristina Sellers  Identified pt with two pt identifiers(name and ). Chief Complaint   Patient presents with    Back Pain       Reviewed record In preparation for visit and have obtained necessary documentation. 1. Have you been to the ER, urgent care clinic or hospitalized since your last visit? No     2. Have you seen or consulted any other health care providers outside of the [address] [place] [address] since your last visit? Include any pap smears or colon screening. No    Vitals reviewed with provider.     Health Maintenance reviewed:     Health Maintenance Due   Topic    Hepatitis C Screening     COVID-19 Vaccine (3 - Booster for Moderna series)     3 most recent PHQ Screens 5/10/2021   Little interest or pleasure in doing things Not at all    Temp Readings from Last 3 Encounters:   02/15/22 97.9 °F (36.6 °C) (Oral)   05/10/21 98.1 °F (36.7 °C) (Oral)   19 98.8 °F (37.1 °C)   eede  BP Readings from Last 3 Encounters:   02/15/22 138/84   05/10/21 118/77   19 114/66   als  Pulse Readings from Last 3 Encounters:   02/15/22 81   05/10/21 65   19 69   etti  Vitals:    02/15/22 1258   BP: 138/84   Pulse: 81   Resp: 12   Temp: 97.9 °F (36.6 °C)   TempSrc: Oral   SpO2: 95%   Weight: 155 lb 8 oz (70.5 kg)   Height: 5' 11\" (1.803 m)   PainSc:   3   PainLoc: Back   ng to plac  Learning Assessment 2016   PRIMARY LEARNER Patient   HIGHEST LEVEL OF EDUCATION - PRIMARY LEARNER  > 4 YEARS OF COLLEGE   BARRIERS PRIMARY LEARNER NONE   CO-LEARNER CAREGIVER No   PRIMARY LANGUAGE ENGLISH   LEARNER PREFERENCE PRIMARY READING   ANSWERED BY patient   RELATIONSHIP SELF

## 2022-02-15 NOTE — PROGRESS NOTES
CC:   Chief Complaint   Patient presents with    Back Pain       HISTORY OF PRESENT ILLNESS  Media Most is a 39 y.o. male. Complains of 3 week history of low back pain. Located mostly at right low back. Constant 3/10 dull ache with intermittent sharp 7/10 pain that radiates down legs (left > right). No known inciting event but recalls carrying his 80 lb dog a couple of days before pain onset. Takes Tylenol with brief pain relief. Pain worse with bending. Not improved over past 3 weeks. Makes it hard for him to play with his children. Pain is 1/10 today. Patient Active Problem List   Diagnosis Code    Migraine G43.909    Tinea versicolor B36.0     Past Medical History:   Diagnosis Date    Headache     Headache(784.0)     Stroke ([address]      Allergies   Allergen Reactions    Cucumber Fruit Extract Anaphylaxis    Fioricet [Butalbital-Acetaminophen-Caff] Other (comments)             PHYSICAL EXAM  Visit Vitals  /84 (BP 1 Location: Left upper arm, BP Patient Position: Sitting)   Pulse 81   Temp 97.9 °F (36.6 °C) (Oral)   Resp 12   Ht 5' 11\" (1.803 m)   Wt 155 lb 8 oz (70.5 kg)   SpO2 95%   BMI 21.69 kg/m²       General: Well-developed and well-nourished, no distress. HEENT:  Head normocephalic/atraumatic, no scleral icterus  Back: Decreased flexion. Mild right paravertebral tenderness. Positive SLR bilaterally. Neurological: Alert and oriented. Non-focal exam.        ASSESSMENT AND PLAN    ICD-10-CM ICD-9-CM    1. Acute right-sided low back pain with bilateral sciatica  M54.42 724.2 methylPREDNISolone (MEDROL DOSEPACK) 4 mg tablet    M54.41 724.3 cyclobenzaprine (FLEXERIL) 10 mg tablet   2. Routine general medical examination at a health care facility  K64.47 E39.2 METABOLIC PANEL, COMPREHENSIVE      CBC WITH AUTOMATED DIFF      LIPID PANEL      HEPATITIS C AB     Diagnoses and all orders for this visit:    1.  Acute right-sided low back pain with bilateral sciatica  -     Start methylPREDNISolone (MEDROL DOSEPACK) 4 mg tablet; Take for 6 days following package instructions. -     cyclobenzaprine (FLEXERIL) 10 mg tablet; Take 1 Tablet by mouth nightly for 10 days. 2. Routine general medical examination at a health care facility  -     METABOLIC PANEL, COMPREHENSIVE; Future  -     CBC WITH AUTOMATED DIFF; Future  -     LIPID PANEL; Future  -     HEPATITIS C AB; Future      Follow-up and Dispositions    · Return in about 3 months (around 5/15/2022), or if symptoms worsen or fail to improve, for Annual physical exam, have fasting labs 1 week prior to appointment. I have discussed the diagnosis with the patient and the intended plan as seen in the above orders. Patient is in agreement. The patient has received an after-visit summary and questions were answered concerning future plans. I have discussed medication side effects and warnings with the patient as well.

## 2022-02-15 NOTE — PATIENT INSTRUCTIONS
Sciatica: Care Instructions  Your Care Instructions     Sciatica (say \"ofh-XF-ut-kuh\") is an irritation of one of the sciatic nerves, which come from the spinal cord in the lower back. The sciatic nerves and their branches extend down through the buttock to the foot. Sciatica can develop when an injured disc in the back irritates or presses against a spinal nerve root. Its main symptom is pain, numbness, or weakness that is often worse in the leg or foot than in the back. Sciatica often will improve and go away with time. Early treatment usually includes medicines and exercises to relieve pain. Follow-up care is a key part of your treatment and safety. Be sure to make and go to all appointments, and call your doctor if you are having problems. It's also a good idea to know your test results and keep a list of the medicines you take. How can you care for yourself at home? · Take pain medicines exactly as directed. ? If the doctor gave you a prescription medicine for pain, take it as prescribed. ? If you are not taking a prescription pain medicine, ask your doctor if you can take an over-the-counter medicine. · Use heat or ice to relieve pain. ? To apply heat, put a warm water bottle, heating pad set on low, or warm cloth on your back. Do not go to sleep with a heating pad on your skin. ? To use ice, put ice or a cold pack on the area for 10 to 20 minutes at a time. Put a thin cloth between the ice and your skin. · Avoid sitting if possible, unless it feels better than standing. · Alternate lying down with short walks. Increase your walking distance as you are able to without making your symptoms worse. · Do not do anything that makes your symptoms worse. When should you call for help? Call 911 anytime you think you may need emergency care. For example, call if:    · You are unable to move a leg at all.    Call your doctor now or seek immediate medical care if:    · You have new or worse symptoms in your legs or buttocks. Symptoms may include:  ? Numbness or tingling. ? Weakness. ? Pain.     · You lose bladder or bowel control. Watch closely for changes in your health, and be sure to contact your doctor if:    · You are not getting better as expected. Where can you learn more? Go to http://www.gray.com/  Enter Z239 in the search box to learn more about \"Sciatica: Care Instructions. \"  Current as of: July 1, 2021               Content Version: 13.0  © 2006-2021 [employer]. Care instructions adapted under license by [employer] (which disclaims liability or warranty for this information). If you have questions about a medical condition or this instruction, always ask your healthcare professional. [address] any warranty or liability for your use of this information. Sciatica: Exercises  Introduction  Here are some examples of typical rehabilitation exercises for your condition. Start each exercise slowly. Ease off the exercise if you start to have pain. Your doctor or physical therapist will tell you when you can start these exercises and which ones will work best for you. When you are not being active, find a comfortable position for rest. Some people are comfortable on the floor or a medium-firm bed with a small pillow under their head and another under their knees. Some people prefer to lie on their side with a pillow between their knees. Don't stay in one position for too long. Take short walks (10 to 20 minutes) every 2 to 3 hours. Avoid slopes, hills, and stairs until you feel better. Walk only distances you can manage without pain, especially leg pain. How to do the exercises  Back stretches    1. Get down on your hands and knees on the floor. 2. Relax your head and allow it to droop. Round your back up toward the ceiling until you feel a nice stretch in your upper, middle, and lower back.  Hold this stretch for as long as it feels comfortable, or about 15 to 30 seconds. 3. Return to the starting position with a flat back while you are on your hands and knees. 4. Let your back sway by pressing your stomach toward the floor. Lift your buttocks toward the ceiling. 5. Hold this position for 15 to 30 seconds. 6. Repeat 2 to 4 times. Follow-up care is a key part of your treatment and safety. Be sure to make and go to all appointments, and call your doctor if you are having problems. It's also a good idea to know your test results and keep a list of the medicines you take. Where can you learn more? Go to http://www.gray.com/  Enter E713 in the search box to learn more about \"Sciatica: Exercises. \"  Current as of: July 1, 2021               Content Version: 13.0  © 2129-3371 [employer]. Care instructions adapted under license by [employer] (which disclaims liability or warranty for this information). If you have questions about a medical condition or this instruction, always ask your healthcare professional. [address] any warranty or liability for your use of this information.

## 2022-03-19 PROBLEM — B36.0 TINEA VERSICOLOR: Status: ACTIVE | Noted: 2018-06-13

## 2022-04-07 ENCOUNTER — APPOINTMENT (OUTPATIENT)
Dept: PHYSICAL THERAPY | Age: 37
End: 2022-04-07

## 2022-04-18 ENCOUNTER — APPOINTMENT (OUTPATIENT)
Dept: PHYSICAL THERAPY | Age: 37
End: 2022-04-18

## 2022-06-13 DIAGNOSIS — J45.30 MILD PERSISTENT ASTHMA WITHOUT COMPLICATION: ICD-10-CM

## 2022-06-13 RX ORDER — FLUTICASONE PROPIONATE 44 UG/1
1 AEROSOL, METERED RESPIRATORY (INHALATION) 2 TIMES DAILY
Qty: 1 EACH | Refills: 1 | Status: SHIPPED | OUTPATIENT
Start: 2022-06-13

## 2022-06-13 NOTE — TELEPHONE ENCOUNTER
Requested Prescriptions     Pending Prescriptions Disp Refills    fluticasone propionate (FLOVENT HFA) 44 mcg/actuation inhaler       Sig: Take 1 Puff by inhalation two (2) times a day.

## 2022-06-13 NOTE — TELEPHONE ENCOUNTER
PCP: Fay Jason MD     Last appt: 2/15/2022   No future appointments. Requested Prescriptions     Pending Prescriptions Disp Refills    fluticasone propionate (FLOVENT HFA) 44 mcg/actuation inhaler 1 Each 1     Sig: Take 1 Puff by inhalation two (2) times a day.

## 2023-05-24 RX ORDER — FLUTICASONE PROPIONATE 44 UG/1
1 AEROSOL, METERED RESPIRATORY (INHALATION) 2 TIMES DAILY
COMMUNITY
Start: 2022-06-13

## 2023-05-24 RX ORDER — METHYLPREDNISOLONE 4 MG/1
TABLET ORAL
COMMUNITY
Start: 2022-02-15